# Patient Record
Sex: MALE | Race: WHITE | Employment: OTHER | URBAN - METROPOLITAN AREA
[De-identification: names, ages, dates, MRNs, and addresses within clinical notes are randomized per-mention and may not be internally consistent; named-entity substitution may affect disease eponyms.]

---

## 2017-01-27 ENCOUNTER — PRE VISIT (OUTPATIENT)
Dept: ANESTHESIOLOGY | Facility: CLINIC | Age: 57
End: 2017-01-27

## 2017-01-27 NOTE — TELEPHONE ENCOUNTER
1.  Date/reason for appt: 2/15/17 - MS   2.  Referring provider: [facility] Neuro Dr. Rodríguez  3.  Call to patient (Yes / No - short description): no, recs in epic  4.  Previous care at:   - [facility]   - [facility]

## 2017-02-15 ENCOUNTER — OFFICE VISIT (OUTPATIENT)
Dept: ANESTHESIOLOGY | Facility: CLINIC | Age: 57
End: 2017-02-15

## 2017-02-15 VITALS
WEIGHT: 269.6 LBS | BODY MASS INDEX: 35.73 KG/M2 | DIASTOLIC BLOOD PRESSURE: 79 MMHG | OXYGEN SATURATION: 98 % | HEIGHT: 73 IN | HEART RATE: 70 BPM | SYSTOLIC BLOOD PRESSURE: 131 MMHG

## 2017-02-15 DIAGNOSIS — M79.2 NEUROPATHIC PAIN: ICD-10-CM

## 2017-02-15 DIAGNOSIS — G37.9 CNS DEMYELINATING DISEASE (H): ICD-10-CM

## 2017-02-15 DIAGNOSIS — M54.2 NECK PAIN OF OVER 3 MONTHS DURATION: ICD-10-CM

## 2017-02-15 DIAGNOSIS — R20.8 ALLODYNIA: Primary | ICD-10-CM

## 2017-02-15 DIAGNOSIS — Z97.8 PRESENCE OF INTRATHECAL PUMP: ICD-10-CM

## 2017-02-15 DIAGNOSIS — F11.90 CHRONIC, CONTINUOUS USE OF OPIOIDS: ICD-10-CM

## 2017-02-15 ASSESSMENT — PAIN SCALES - GENERAL: PAINLEVEL: MODERATE PAIN (5)

## 2017-02-15 ASSESSMENT — ANXIETY QUESTIONNAIRES
7. FEELING AFRAID AS IF SOMETHING AWFUL MIGHT HAPPEN: 0 = NOT AT ALL
GAD7 TOTAL SCORE: 0
GAD7 TOTAL SCORE: 0

## 2017-02-15 NOTE — LETTER
2/15/2017       RE: Jeff Lewis  [address] [place] [address]     Dear Colleague,    Thank you for referring your patient, Jeff Lewis, to the [facility] FOR COMPREHENSIVE PAIN MANAGEMENT at [facility]. Please see a copy of my visit note below.    I had the pleasure of meeting Mr. Jeff Lewis on 2/15/2017 in the outpatient pain clinic in consult for Dr. Rodríguez with regards to his chronic neck pain.  HPI:  57yo male with past medical history of Multiple Sclerosis, bicuspid aortic valve, chronic pain syndrome and obestiy presents for evaluation of his chronic neck pain.   The pain began over 10 years ago when he was diagnosed with MS.  His symptoms started with right cervical paraspinal neuropathic pain that radiates to the anterior neck and jaw.  He stated that at first he was diagnosed with fibromyalgia, however MRI imaging noted a hyperintense lesion on the right side of his upper cervical spinal cord and an LP showed oligoclonal bands and he was diagnosed with MS.   He states his uncle has MS as well.  He has been following Dr. Rodríguez here at the MS clinic.   According to his notes Jeff does not have symptoms of relapsing/remitting type of MS and his course has been relatively stable and he is not on any disease modifying therapies currently.   His pain has been intractable however.    He is currently under the care of [facility] and has an opioid contract with them.   He was on chronic oral opioids and was implanted with a ITP about 5 years ago.   He had it replaced in 2015 but had complications in the hospital including severe nausea and sedation.  His current pump has prialt and hydromorphone (he reports he is on his lowest dose in years).   With regards to efficacy, he said he had some partial benefit years ago but currently cannot tell if it is working or not.   According to his wife, prior to October of last  "year he was very sedated, unable to get out of bed and seemed \"out of it\".  His dose was decreased a few months ago and his wife noted that he has much more energy and motivation and Jeff agrees.     From a functional standpoint, Jeff agrees he can handle all his activities of daily living, he drives himself, can lift his grandkids and does chores around the house.   The pain is always present but currently is not limiting his function.   He worked as a  for many years and his biggest complaint is that he cannot return to work, which he states is because of his ITP.  He states he feels his relationship with [employer] is deteriorating.  He has to drive over an hour to meet with them.   He feels they are pressuring him to increase his pain pump dose again which he is reluctant to do.  He is also reluctant to try to shut it off and explant as well, but he cannot definitively state that it is working for him.   He is upset that he does not see a physician routinely there either.   He would like to transfer his care to our clinic for these reasons and Dr. Rodríguez referred him to us for evaluation..    Location: cervcial spine midline with radiation to anterior jaw line and down to clavical  Quality: sharp stabbing.  Severity: 6/10  Timing: constant  Duration: years (10 years)  Context: MS - demyelenating lesion  Aggravating Factors:light touch, heat, stress  Relieving Factors:cold  Associated Signs/Symptoms: fatigue    he has tried the following therapies for his pain:  Medications:   Current:  -ITP - prialt and hydromorphone  -Hydromorphone PO - prescribed by [facility]  In 4/2016 -not taking currently - does not like them -cause nausea  -Ibuprofen 800mg - 1-2 a month - help with back pain, not neck  -Lidocaine cream - helps.  Past:  -Baclofen - weight gain  -Tizanidine - not effective  -Steroids, IV/Patch/PO - no relief, mood changes  -Gabapentin- undesirable weight gain  -Topamax - no help  -Cymbalta - was " "stopped after his pump implant - didn't restart - had sexual side effects, also felt it wasn't effective.    Physical Therapy:  - currently walking and riding a stationary bike (quit for last month) - denies any formal PT therapy for his neck.  Injections/Interventions:  - Has had MBNB at [facility] with no effect  Pain Psychology:  - never seen  Other Modalities:  Chiropractic care:yes  Acupuncture:have not done  TENS:can't tolerate  Water Based Therapy:no  Meditation:no  Yoga:no    Past Medical History   Diagnosis Date     Back pain      Coarctation of aorta      CTS (carpal tunnel syndrome)      Dyslipidemia      Falls      past hx of falls     MS (multiple sclerosis) ([facility])      Other chronic pain      Pain 8/15/2015     Pneumonia      \"yearly\" per pt\"     Polyuria      Preiser disease      Right bundle branch block      Vitamin D deficiency     past medical history reviewed with patient.   Past Surgical History   Procedure Laterality Date     Gallbladder surgery       Wrist surgery       Cholecystectomy  2010     Ent surgery  1963     Insert intrathecal pain pump       Bilateral carapl tunnel       Replace intrathecal pain pump N/A 8/14/2015     Procedure: REPLACE INTRATHECAL PAIN PUMP;  Surgeon: Sukumar Salinas MD;  Location: [facility] OR    past surgical history reviewed with patient.   Medications:  Current Outpatient Prescriptions   Medication     IBUPROFEN PO     Medication given by intrathecal pump     No current facility-administered medications for this visit.      [place] and [place] Prescription Monitoring Program reviewed and no aberrancies noted.     Allergies:     Allergies   Allergen Reactions     Naprosyn [Naproxen] Swelling     Nuts Swelling     Tree nuts     Family History:  family history includes CANCER in his father and mother; Neurologic Disorder in an other family member.  Social history: he lives in [place] with wife. he is not currently working and is on disability.  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   Smoking: " "3-4 cigarettes a day. Alcohol: no. Street drugs: no.     ROS:  Skin: negative for rash or swelling  Eyes: + for double vision - for blurry vision  Ears/Nose/Throat: negative for dysphagia or sore throat  Respiratory: negative for shortness of breath or cough  Cardiovascular: negative for chest pain or dyspnea on exertion, +for bicuspid aortic valve  Gastrointestinal: negative for abdominal pain, nausea, decreased appetite or bowel incontinence  Genitourinary: negative for bladder incontinence, hematuria and frequent urination  Musculoskeletal:negative for joint pain, myalgias, low back pain and cervical spine pain.  Neurologic: + for weakness in legs, negative numbness or paresthesias  Psychiatric: negative for depression, anxiety and SI/HI  Hematologic/Lymphatic/Immunologic: Negative for anemia, easy bruising bleeding, frequent infections.  Endocrine: negative for thyroid abnormalities and diabetes    Objective:   /79  Pulse 70  Ht 1.854 m (6' 1\")  Wt 122.3 kg (269 lb 9.6 oz)  SpO2 98%  BMI 35.57 kg/m2  Body mass index is 35.57 kg/(m^2).  General: In no apparent distress, obese  Mental status: Normal affect, pleasant  Head: Atraumatic, normocephalic  Eyes: Extra-ocular movements grossly intact, no scleral icterus  Cardiovascular: Regular rate  Respiratory:No respiratory distress  Abdomen:soft, non-distended  Msk:   Cervical Spine Exam: No lesions noted, normal ROM, +TTP of right paraspinal muscles, neck and jaw with significant allodynia.  -Spurling's Bilaterally, -Facet loading bilaterally, CN2-12 grossly intact.      Strength 5/5 for bilateral shoulder abduction, elbow flexion, wrist extension, elbow extension, finger abduction, and grasp. Sensation intact to light touch throughout the C5-T1 dermatomes of the bilateral upper extremities.  Reflexes 2+/4 and symmetric for biceps, triceps, brachioradialis. Negative becker's bilaterally.   Skin: No rashes or lesions noted on exposed areas of skin.  Old " surgical scar on right hand and wrist from injury.  Lymph: no supraclavicular lymphadenopathy    Imaging:   MR CERVICAL SPINE W/O & W CONTRAST 10/20/2016 11:54 AM     History: Multiple sclerosis. Gait disturbance     Comparison: MRI cervical spine 6/13/2014, 2/10/2011     Technique: Sagittal T1-weighted, sagittal T2-weighted, sagittal  diffusion weighted, axial T2-weighted, and axial T2* gradient echo  images of the cervical spine were obtained without intravenous  contrast. Following intravenous administration of gadolinium, axial  and sagittal T1-weighted images with fat saturation were also  obtained.     Contrast: 10 ml Gadavist injected     Findings:     Stable patchy T2 hyperintense signal within the right dorsal lateral  aspect of the cord C2-C3 (se6/im6-8). No new cord T2 signal  abnormality. No abnormal intrathecal enhancement.     Normal alignment of the cervical vertebrae. Straightening of the  normal cervical lordosis. Congenital spinal canal narrowing with  superimposed spondylosis C3-C7. Multilevel disc degeneration, uncinate  spurring and facet hypertrophy. Normal vertebral marrow signal.  Unchanged loss of the normal left vertebral artery V2 segment  flow-void.      The findings on a level by level basis are as follows:     C2-3: No spinal canal or neural foraminal narrowing.     C3-4: Mild spinal canal stenosis. Moderate right and mild left neural  foraminal stenosis.      C4-5: Mild to moderate central canal stenosis. Moderate to advanced  right and moderate left neural foraminal stenosis.     C5-6: Mild spinal canal stenosis. Moderate to advanced left and  moderate right neural foraminal stenosis.     C6-7: Mild spinal canal stenosis. Mild bilateral neural foraminal  stenosis.     C7-T1: No spinal canal or neural foraminal narrowing.      No abnormality of the paraspinous soft tissues. New 2.0 cm left level  2 cervical lymph node. (Series 7, image 7)         Impression:   1. Stable right  dorsolateral cord T2 signal abnormality at C2-C3,  compatible with history of demyelinating disease. No evidence of  interval or active demyelination.  2. Stable multilevel cervical spondylosis superimposed on congenital  spinal canal narrowing. Mild/moderate central canal stenosis at L4-L5.  Moderate to advanced right neural foraminal stenosis at C4-C5 and on  the left at C5-C6.  3. Stable left vertebral artery V2 segment occlusion.   4. New enlarged left level 2 cervical lymph node. While this may be  reactive, recommend short-term clinical follow-up.    Assessment:  1. Chronic Neuropathic Pain  2. Allodynia - cervical and anterior neck  3. Multiple Sclerosis - CNS demyelinating disease  4. Presence of ITP  5. Chronic, continuous use of opioids.    Mr. Lewis is an unfortunate man with severe neuropathic pain and nondermatomal allodynia presumable secondary to a spinal cord lesion related to MS.  The pain has been persistent for years.   He has a ITP in place that is currently managed by [employer].  I explained to him our clinic policy of not assuming management of ITP that have been [place] by [place] groups [place] as it [place] their responsibility to manage their surgical implants.   However, he has been seemingly very sedated on higher doses as evidenced by his increase in alertness with decreasing the dose.  He is currently unsure of how much analgesic effect he is getting from this pump.    His functional status is excellent right now, which I explained is the primary outcome that I measure at our pain clinic.   I did discuss with him the option of turning his pump off for a 3-4 week period and if his function remains the same and his pain isn't much worse I could accept him as a patient with the goal of explanting the pump through NSGY, but the dose decrease again would have to be managed by [employer] as his opioid contract is through them.   This would allow him to theoretically attempt a return to work,  which seems to be his main limiting factor in quality of life.    He agreed to consider it.  If he decides to explant his pump I would be happy to assume continued pain management for him.    Plan:   1. Patient education: I went over the above diagnoses and treatment plan with him and answered all of his questions.  2. Imaging review: I reviewed the above imaging with him   3. Interventions: none recommended at this time.  4. Medications: I discussed the merits of returning to normal neuropathic pain medications, topical creams (gabapentin/ketamine PLO) and lidocaine cream which he does notes help.   The goal would be to maintain function and allow him to return to work as that seems to be his primary goal.  5. Exercise program:I emphasized the benefits of daily exercise and smoking cessation in managing chronic pain.  6. Behavioral Psychology: I think he would benefit from mindfulness training and pain psychology counseling as well as possibly integrative medicine to help him manage pain flares.  7. Further Diagnostic Testing/Imaging:None at this time  8. Referrals: He is interested in acupuncture as well, I will provide a referral.  9. Follow up: He can call to schedule an appointment with me if he decides to wean off his pain pump as I cannot take over management of this device at this time.    Total time spent was 60 minutes, and more than 50% of face to face time was spent in counseling and/or coordination of care regarding the above plan.    Thank you for the consult.   Eligio Michelle DO    [facility]  Pain Management  Department of Anesthesiology    Again, thank you for allowing me to participate in the care of your patient.      Sincerely,    Eligio Michelle DO

## 2017-02-15 NOTE — NURSING NOTE
Reviewed AVS with patient includin. [facility] [phone]  Karthik Smith   [address], [place]      2. Follow up with [facility] for continued management of your medications and intrathecal pump.    3. If you chose to wean/stop your intrathecal pump and want to follow up with us for further care, please call our clinic to set up an appointment.     Pt verbalized understanding of instructions.

## 2017-02-15 NOTE — PROGRESS NOTES
"I had the pleasure of meeting Mr. Jeff Lewis on 2/15/2017 in the outpatient pain clinic in consult for Dr. Rodríguez with regards to his chronic neck pain.  HPI:  55yo male with past medical history of Multiple Sclerosis, bicuspid aortic valve, chronic pain syndrome and obestiy presents for evaluation of his chronic neck pain.   The pain began over 10 years ago when he was diagnosed with MS.  His symptoms started with right cervical paraspinal neuropathic pain that radiates to the anterior neck and jaw.  He stated that at first he was diagnosed with fibromyalgia, however MRI imaging noted a hyperintense lesion on the right side of his upper cervical spinal cord and an LP showed oligoclonal bands and he was diagnosed with MS.   He states his uncle has MS as well.  He has been following Dr. Rodríguez here at the MS clinic.   According to his notes Jeff does not have symptoms of relapsing/remitting type of MS and his course has been relatively stable and he is not on any disease modifying therapies currently.   His pain has been intractable however.    He is currently under the care of [facility] and has an opioid contract with them.   He was on chronic oral opioids and was implanted with a ITP about 5 years ago.   He had it replaced in 2015 but had complications in the hospital including severe nausea and sedation.  His current pump has prialt and hydromorphone (he reports he is on his lowest dose in years).   With regards to efficacy, he said he had some partial benefit years ago but currently cannot tell if it is working or not.   According to his wife, prior to October of last year he was very sedated, unable to get out of bed and seemed \"out of it\".  His dose was decreased a few months ago and his wife noted that he has much more energy and motivation and Jeff agrees.     From a functional standpoint, Jeff agrees he can handle all his activities of daily living, he drives himself, " can lift his grandkids and does chores around the house.   The pain is always present but currently is not limiting his function.   He worked as a  for many years and his biggest complaint is that he cannot return to work, which he states is because of his ITP.  He states he feels his relationship with [employer] is deteriorating.  He has to drive over an hour to meet with them.   He feels they are pressuring him to increase his pain pump dose again which he is reluctant to do.  He is also reluctant to try to shut it off and explant as well, but he cannot definitively state that it is working for him.   He is upset that he does not see a physician routinely there either.   He would like to transfer his care to our clinic for these reasons and Dr. Rodríguez referred him to us for evaluation..    Location: cervcial spine midline with radiation to anterior jaw line and down to clavical  Quality: sharp stabbing.  Severity: 6/10  Timing: constant  Duration: years (10 years)  Context: MS - demyelenating lesion  Aggravating Factors:light touch, heat, stress  Relieving Factors:cold  Associated Signs/Symptoms: fatigue    he has tried the following therapies for his pain:  Medications:   Current:  -ITP - prialt and hydromorphone  -Hydromorphone PO - prescribed by [facility]  In 4/2016 -not taking currently - does not like them -cause nausea  -Ibuprofen 800mg - 1-2 a month - help with back pain, not neck  -Lidocaine cream - helps.  Past:  -Baclofen - weight gain  -Tizanidine - not effective  -Steroids, IV/Patch/PO - no relief, mood changes  -Gabapentin- undesirable weight gain  -Topamax - no help  -Cymbalta - was stopped after his pump implant - didn't restart - had sexual side effects, also felt it wasn't effective.    Physical Therapy:  - currently walking and riding a stationary bike (quit for last month) - denies any formal PT therapy for his neck.  Injections/Interventions:  - Has had MBNB at [facility] with no effect  Pain  "Psychology:  - never seen  Other Modalities:  Chiropractic care:yes  Acupuncture:have not done  TENS:can't tolerate  Water Based Therapy:no  Meditation:no  Yoga:no    Past Medical History   Diagnosis Date     Back pain      Coarctation of aorta      CTS (carpal tunnel syndrome)      Dyslipidemia      Falls      past hx of falls     MS (multiple sclerosis) (H)      Other chronic pain      Pain 8/15/2015     Pneumonia      \"yearly\" per pt\"     Polyuria      Preiser disease      Right bundle branch block      Vitamin D deficiency     past medical history reviewed with patient.   Past Surgical History   Procedure Laterality Date     Gallbladder surgery       Wrist surgery       Cholecystectomy  2010     Ent surgery  1963     Insert intrathecal pain pump       Bilateral carapl tunnel       Replace intrathecal pain pump N/A 8/14/2015     Procedure: REPLACE INTRATHECAL PAIN PUMP;  Surgeon: Sukumar Salinas MD;  Location: [facility] OR    past surgical history reviewed with patient.   Medications:  Current Outpatient Prescriptions   Medication     IBUPROFEN PO     Medication given by intrathecal pump     No current facility-administered medications for this visit.      [place] and [place] Prescription Monitoring Program reviewed and no aberrancies noted.     Allergies:     Allergies   Allergen Reactions     Naprosyn [Naproxen] Swelling     Nuts Swelling     Tree nuts     Family History:  family history includes CANCER in his father and mother; Neurologic Disorder in an other family member.  Social history: he lives in [place] with wife. he is not currently working and is on disability.  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   Smoking: 3-4 cigarettes a day. Alcohol: no. Street drugs: no.     ROS:  Skin: negative for rash or swelling  Eyes: + for double vision - for blurry vision  Ears/Nose/Throat: negative for dysphagia or sore throat  Respiratory: negative for shortness of breath or cough  Cardiovascular: negative for chest pain or dyspnea on " "exertion, +for bicuspid aortic valve  Gastrointestinal: negative for abdominal pain, nausea, decreased appetite or bowel incontinence  Genitourinary: negative for bladder incontinence, hematuria and frequent urination  Musculoskeletal:negative for joint pain, myalgias, low back pain and cervical spine pain.  Neurologic: + for weakness in legs, negative numbness or paresthesias  Psychiatric: negative for depression, anxiety and SI/HI  Hematologic/Lymphatic/Immunologic: Negative for anemia, easy bruising bleeding, frequent infections.  Endocrine: negative for thyroid abnormalities and diabetes    Objective:   /79  Pulse 70  Ht 1.854 m (6' 1\")  Wt 122.3 kg (269 lb 9.6 oz)  SpO2 98%  BMI 35.57 kg/m2  Body mass index is 35.57 kg/(m^2).  General: In no apparent distress, obese  Mental status: Normal affect, pleasant  Head: Atraumatic, normocephalic  Eyes: Extra-ocular movements grossly intact, no scleral icterus  Cardiovascular: Regular rate  Respiratory:No respiratory distress  Abdomen:soft, non-distended  Msk:   Cervical Spine Exam: No lesions noted, normal ROM, +TTP of right paraspinal muscles, neck and jaw with significant allodynia.  -Spurling's Bilaterally, -Facet loading bilaterally, CN2-12 grossly intact.      Strength 5/5 for bilateral shoulder abduction, elbow flexion, wrist extension, elbow extension, finger abduction, and grasp. Sensation intact to light touch throughout the C5-T1 dermatomes of the bilateral upper extremities.  Reflexes 2+/4 and symmetric for biceps, triceps, brachioradialis. Negative becker's bilaterally.   Skin: No rashes or lesions noted on exposed areas of skin.  Old surgical scar on right hand and wrist from injury.  Lymph: no supraclavicular lymphadenopathy    Imaging:   MR CERVICAL SPINE W/O & W CONTRAST 10/20/2016 11:54 AM     History: Multiple sclerosis. Gait disturbance     Comparison: MRI cervical spine 6/13/2014, 2/10/2011     Technique: Sagittal T1-weighted, sagittal " T2-weighted, sagittal  diffusion weighted, axial T2-weighted, and axial T2* gradient echo  images of the cervical spine were obtained without intravenous  contrast. Following intravenous administration of gadolinium, axial  and sagittal T1-weighted images with fat saturation were also  obtained.     Contrast: 10 ml Gadavist injected     Findings:     Stable patchy T2 hyperintense signal within the right dorsal lateral  aspect of the cord C2-C3 (se6/im6-8). No new cord T2 signal  abnormality. No abnormal intrathecal enhancement.     Normal alignment of the cervical vertebrae. Straightening of the  normal cervical lordosis. Congenital spinal canal narrowing with  superimposed spondylosis C3-C7. Multilevel disc degeneration, uncinate  spurring and facet hypertrophy. Normal vertebral marrow signal.  Unchanged loss of the normal left vertebral artery V2 segment  flow-void.      The findings on a level by level basis are as follows:     C2-3: No spinal canal or neural foraminal narrowing.     C3-4: Mild spinal canal stenosis. Moderate right and mild left neural  foraminal stenosis.      C4-5: Mild to moderate central canal stenosis. Moderate to advanced  right and moderate left neural foraminal stenosis.     C5-6: Mild spinal canal stenosis. Moderate to advanced left and  moderate right neural foraminal stenosis.     C6-7: Mild spinal canal stenosis. Mild bilateral neural foraminal  stenosis.     C7-T1: No spinal canal or neural foraminal narrowing.      No abnormality of the paraspinous soft tissues. New 2.0 cm left level  2 cervical lymph node. (Series 7, image 7)         Impression:   1. Stable right dorsolateral cord T2 signal abnormality at C2-C3,  compatible with history of demyelinating disease. No evidence of  interval or active demyelination.  2. Stable multilevel cervical spondylosis superimposed on congenital  spinal canal narrowing. Mild/moderate central canal stenosis at L4-L5.  Moderate to advanced right  neural foraminal stenosis at C4-C5 and on  the left at C5-C6.  3. Stable left vertebral artery V2 segment occlusion.   4. New enlarged left level 2 cervical lymph node. While this may be  reactive, recommend short-term clinical follow-up.    Assessment:  1. Chronic Neuropathic Pain  2. Allodynia - cervical and anterior neck  3. Multiple Sclerosis - CNS demyelinating disease  4. Presence of ITP  5. Chronic, continuous use of opioids.    Mr. Lewis is an unfortunate man with severe neuropathic pain and nondermatomal allodynia presumable secondary to a spinal cord lesion related to MS.  The pain has been persistent for years.   He has a ITP in place that is currently managed by [employer].  I explained to him our clinic policy of not assuming management of ITP that have been [place] by [place] groups [place] as it [place] their responsibility to manage their surgical implants.   However, he has been seemingly very sedated on higher doses as evidenced by his increase in alertness with decreasing the dose.  He is currently unsure of how much analgesic effect he is getting from this pump.    His functional status is excellent right now, which I explained is the primary outcome that I measure at our pain clinic.   I did discuss with him the option of turning his pump off for a 3-4 week period and if his function remains the same and his pain isn't much worse I could accept him as a patient with the goal of explanting the pump through [facility], but the dose decrease again would have to be managed by [employer] as his opioid contract is through them.   This would allow him to theoretically attempt a return to work, which seems to be his main limiting factor in quality of life.    He agreed to consider it.  If he decides to explant his pump I would be happy to assume continued pain management for him.    Plan:   1. Patient education: I went over the above diagnoses and treatment plan with him and answered all of his  questions.  2. Imaging review: I reviewed the above imaging with him   3. Interventions: none recommended at this time.  4. Medications: I discussed the merits of returning to normal neuropathic pain medications, topical creams (gabapentin/ketamine PLO) and lidocaine cream which he does notes help.   The goal would be to maintain function and allow him to return to work as that seems to be his primary goal.  5. Exercise program:I emphasized the benefits of daily exercise and smoking cessation in managing chronic pain.  6. Behavioral Psychology: I think he would benefit from mindfulness training and pain psychology counseling as well as possibly integrative medicine to help him manage pain flares.  7. Further Diagnostic Testing/Imaging:None at this time  8. Referrals: He is interested in acupuncture as well, I will provide a referral.  9. Follow up: He can call to schedule an appointment with me if he decides to wean off his pain pump as I cannot take over management of this device at this time.    Total time spent was 60 minutes, and more than 50% of face to face time was spent in counseling and/or coordination of care regarding the above plan.    Thank you for the consult.   Eligio Michelle DO    [facility]  Pain Management  Department of Anesthesiology        Answers for HPI/ROS submitted by the patient on 2/15/2017   General Symptoms: No  Skin Symptoms: No  HENT Symptoms: No  EYE SYMPTOMS: No  HEART SYMPTOMS: No  LUNG SYMPTOMS: No  INTESTINAL SYMPTOMS: No  URINARY SYMPTOMS: No  REPRODUCTIVE SYMPTOMS: No  SKELETAL SYMPTOMS: No  BLOOD SYMPTOMS: No  NERVOUS SYSTEM SYMPTOMS: No  MENTAL HEALTH SYMPTOMS: No  PHQ-2 Depressed: Not at all, Not at all  PHQ-2 Score: 0  LEANNA 7 TOTAL SCORE: 0

## 2017-02-15 NOTE — MR AVS SNAPSHOT
After Visit Summary   2/15/2017    Jeff Lewis    MRN: [id number]           Patient Information     Date Of Birth          1960        Visit Information        Provider Department      2/15/2017 7:00 AM Eligio Michelle DO [facility]        Today's Diagnoses     Allodynia    -  1    Neck pain of over 3 months duration        Neuropathic pain        CNS demyelinating disease (H)        Presence of intrathecal pump        Chronic, continuous use of opioids          Care Instructions    1. [facility] [phone]  Karthik Smith   [address], [place]      2. Follow up with [facility] for continued management of your medications and intrathecal pump.    3. If you chose to wean/stop your intrathecal pump and want to follow up with us for further care, please call our clinic to set up an appointment.     To speak with a nurse, schedule/reschedule/cancel a clinic appointment, or request a medication refill call: [phone]     *You can also reach us by Stylefinch: [email or web address]                    Follow-ups after your visit        Additional Services     ACUPUNCTURE REFERRAL       Your provider has referred you to: Karthik Smith    Please be aware that coverage of these services is subject to the terms and limitations of your health insurance plan.  Call member services at your health plan with any benefit or coverage questions.      Please bring the following with you to your appointment:    (1) Any X-Rays, CTs or MRIs which have been performed.  Contact the facility where they were done to arrange for  prior to your scheduled appointment.  (2) List of current medications   (3) This referral request   (4) Any documents/labs given to you for this referral  Services Requested: MEDICAL: Consultation for Medical Management/Behavioral/Reconditioning Therapy    Please answer the following  questions:    1. How long have you been treating this patient for this pain issue?      10 year(s)    2. Have there been any of the following problematic behaviors?      Medication Management Issues: No      Chemical Dependency: NO      Psychiatric History or Current Psych/Social Issues: NO    3. Has the patient been to any other pain clinics and/or programs?      YES (provide service and date): currently with [facility] who manages IT Pump.                  Your next 10 appointments already scheduled     Mar 30, 2017  8:00 AM CDT   (Arrive by 7:45 AM)   Return Multiple Sclerosis with Federico Rodríguez MD   [facility] ([facility])    [address]  3rd Floor  [facility] [address] [phone]              Who to contact     Please call your clinic at [phone] to:    Ask questions about your health    Make or cancel appointments    Discuss your medicines    Learn about your test results    Speak to your doctor   If you have compliments or concerns about an experience at your clinic, or if you wish to file a complaint, please contact [facility] Patient Relations at [phone] or email us at [email or web address]         Additional Information About Your Visit        [employer] Information     [employer] is an electronic gateway that provides easy, online access to your medical records. With TagMan, you can request a clinic appointment, read your test results, renew a prescription or communicate with your care team.     To sign up for [employer] visit the website at [email or web address]   You will be asked to enter the access code listed below, as well as some personal information. Please follow the directions to create your username and password.     Your access code is: [email or web address]  Expires: 3/26/2017  6:30 AM     Your access code will  in 90 days. If you need help or a new code, please contact your [facility]  "[facility] or call [phone] for assistance.        Care EveryWhere ID     This is your Care EveryWhere ID. This could be used by other organizations to access your [place] medical records  [id number]        Your Vitals Were     Pulse Height Pulse Oximetry BMI (Body Mass Index)          70 1.854 m (6' 1\") 98% 35.57 kg/m2         Blood Pressure from Last 3 Encounters:   02/15/17 131/79   10/20/16 149/86   09/29/16 (!) 145/93    Weight from Last 3 Encounters:   02/15/17 122.3 kg (269 lb 9.6 oz)   09/29/16 122.8 kg (270 lb 12.8 oz)   02/24/16 119.7 kg (264 lb)              We Performed the Following     [facility]CTURE REFERRAL     [facility]          Today's Medication Changes          These changes are accurate as of: 2/15/17  8:07 AM.  If you have any questions, ask your nurse or doctor.               These medicines have changed or have updated prescriptions.        Dose/Directions    Medication given by intrathecal pump   This may have changed:  additional instructions   Used for:  Cervicalgia        NO DRUG NEEDED.   Quantity:  1 each   Refills:  0                Primary Care Provider Office Phone # Fax #    Sukumar España [phone]       [facility] [address]  [facility] [address]        Thank you!     Thank you for choosing [facility]  for your care. Our goal is always to provide you with excellent care. Hearing back from our patients is one way we can continue to improve our services. Please take a few minutes to complete the written survey that you may receive in the mail after your visit with us. Thank you!             Your Updated Medication List - Protect others around you: Learn how to safely use, store and throw away your medicines at www.disposemymeds.org.          This list is accurate as of: 2/15/17  8:07 AM.  Always use your most recent med list.                "    Brand Name Dispense Instructions for use    IBUPROFEN PO      Take 600 mg by mouth every 6 hours as needed for moderate pain       Medication given by intrathecal pump     1 each    NO DRUG NEEDED.

## 2017-02-15 NOTE — PATIENT INSTRUCTIONS
1. [facility] [phone]  Karthik Smith   [address], [place]      2. Follow up with [facility] for continued management of your medications and intrathecal pump.    3. If you chose to wean/stop your intrathecal pump and want to follow up with us for further care, please call our clinic to set up an appointment.     To speak with a nurse, schedule/reschedule/cancel a clinic appointment, or request a medication refill call: [phone]     *You can also reach us by Brainly: [email or web address]

## 2017-02-16 ASSESSMENT — ANXIETY QUESTIONNAIRES: GAD7 TOTAL SCORE: 0

## 2017-03-14 ENCOUNTER — TRANSFERRED RECORDS (OUTPATIENT)
Dept: HEALTH INFORMATION MANAGEMENT | Facility: CLINIC | Age: 57
End: 2017-03-14

## 2017-03-15 ENCOUNTER — TRANSFERRED RECORDS (OUTPATIENT)
Dept: HEALTH INFORMATION MANAGEMENT | Facility: CLINIC | Age: 57
End: 2017-03-15

## 2017-03-20 ENCOUNTER — TELEPHONE (OUTPATIENT)
Dept: NEUROLOGY | Facility: CLINIC | Age: 57
End: 2017-03-20

## 2017-03-20 DIAGNOSIS — G37.9 CNS DEMYELINATING DISEASE (H): Primary | ICD-10-CM

## 2017-03-20 NOTE — TELEPHONE ENCOUNTER
I called David, but I was unable to reach him; I left [facility] for him asking him to call me back to discuss his MRI results.    Talya Jaramillo, MS RN Care Coordinator

## 2017-03-20 NOTE — TELEPHONE ENCOUNTER
He had a pontine lesion on the recent MRI that is (probably) new, but not clear if it is acute or not. It would have been helpful if they had discussed this with us prior to the MRI being done--lack of contrast makes the interpretation difficult.     This raises a couple of questions, including whether we should treat with steroids (I would probably not favor that unless he finds that the facial numbness is interfering with function) and whether he should be on disease modifying therapy with MS.    Ideally he should have a repeat MRI with contrast; unfortunately his appointment next week is at 8 so that will not be possible on the day of the visit unless he does it after.

## 2017-03-20 NOTE — TELEPHONE ENCOUNTER
Patient saw his PCP last week for left facial numbness, and subsequently, had an MRI of the brain done; Patient had these images pushed to [facility], which are now available; Scanned in the media tab is the office visit note with his PCP and the MRI brain report; Dr Rodríguez, he is scheduled to see you next Thursday 3/30/17; Is there anything you would like done prior? I have not personally spoken with the patient; Thank you.    Talya Jaramillo, MS RN Care Coordinator

## 2017-03-21 ENCOUNTER — TELEPHONE (OUTPATIENT)
Dept: INTERVENTIONAL RADIOLOGY/VASCULAR | Facility: CLINIC | Age: 57
End: 2017-03-21

## 2017-03-21 NOTE — TELEPHONE ENCOUNTER
MRI order placed per Dr Rodríguez; When I was on the phone with David kilgore, he said that any day/time would be okay for his MRI; I called MRI scheduling and was able to get scheduled for this Thursday, 3/23/17 at 10am at [facility], with a check in time of 9am; He will check in to the [facility] waiting room at 2A; No solid foods prior to 10am (2am), and no clear liquids at 4 hours prior to 10am (6am); He must have a ; I called Jeff and went through all of this with him, and he is able to make this work; He has no further questions at this time; He understands that we will be in touch with him after his MRIs have been reviewed by the radiologist and Dr Rodríguez.    Talya Jaramillo, MS RN Care Coordinator

## 2017-03-21 NOTE — TELEPHONE ENCOUNTER
"I called David to go over with him the below information from Dr Rodríguez; When David answered, he said \"it's really getting worse\"; I decided to start from the beginning with him to be sure we don't miss anything; He said that two weeks ago, he started with a change in sensation of the left side of his face, which he described as a little tingly; Now, he is completely numb on the left face from his scalp, down to his chin/jawline, including his cheek, ear, eye and left side of his mouth; This numbness is constant; He best describes this as \"being shot with Novocain\"; He did check with his dentist to see if he possibly had a tooth infection, but his dentist said no; He also said that he has been getting really bad headaches, which he hasn't had bad headaches like this before; He said that the numbness has some discomfort to it; No speech changes; He does have to be careful when he eats since his face/mouth is so numb; He has not been coughing or choking on foods; He doesn't feel that his swallowing is impaired; He said that his balance has been off more as well, such as he has to hold on to things more when he walks, and he feels more clumsy; He said that he can't just stand without leaning against the wall, for example; No dizziness; No other numbness/tingling/weakness; He has been getting more exhausted with activities, such as going up a flight of stairs; No bowel or bladder changes from baseline; I talked with him about Dr Rodríguez's input; He finds the symptoms bothersome enough that he would like steroids, even though he doesn't like them; He would like to have the MRI done recommended by Dr Rodríguez, and he would like it done at [facility] with conscious sedation; I told him that I would send all of this to Dr Rodríguez, then call him back later today; Dr Rodríguez, would you like me to order the MRI with IV conscious sedation and steroids? Or do you want to wait on the steroids based on his MRI result? " Thank you.    Talya Jaramillo, MS RN Care Coordinator

## 2017-03-23 ENCOUNTER — HOSPITAL ENCOUNTER (OUTPATIENT)
Dept: MRI IMAGING | Facility: CLINIC | Age: 57
End: 2017-03-23
Attending: PSYCHIATRY & NEUROLOGY | Admitting: PSYCHIATRY & NEUROLOGY
Payer: COMMERCIAL

## 2017-03-23 ENCOUNTER — APPOINTMENT (OUTPATIENT)
Dept: MEDSURG UNIT | Facility: CLINIC | Age: 57
End: 2017-03-23
Attending: PSYCHIATRY & NEUROLOGY
Payer: COMMERCIAL

## 2017-03-23 ENCOUNTER — HOSPITAL ENCOUNTER (OUTPATIENT)
Facility: CLINIC | Age: 57
Discharge: HOME | End: 2017-03-23
Attending: PSYCHIATRY & NEUROLOGY | Admitting: PSYCHIATRY & NEUROLOGY
Payer: COMMERCIAL

## 2017-03-23 VITALS
DIASTOLIC BLOOD PRESSURE: 68 MMHG | WEIGHT: 260 LBS | HEIGHT: 73 IN | BODY MASS INDEX: 34.46 KG/M2 | RESPIRATION RATE: 16 BRPM | SYSTOLIC BLOOD PRESSURE: 121 MMHG | OXYGEN SATURATION: 95 % | HEART RATE: 70 BPM | TEMPERATURE: 97.9 F

## 2017-03-23 VITALS
OXYGEN SATURATION: 99 % | DIASTOLIC BLOOD PRESSURE: 82 MMHG | HEART RATE: 66 BPM | RESPIRATION RATE: 16 BRPM | SYSTOLIC BLOOD PRESSURE: 143 MMHG

## 2017-03-23 DIAGNOSIS — G37.9 CNS DEMYELINATING DISEASE (H): ICD-10-CM

## 2017-03-23 PROCEDURE — 40000166 ZZH STATISTIC PP CARE STAGE 1

## 2017-03-23 PROCEDURE — A9585 GADOBUTROL INJECTION: HCPCS | Performed by: PSYCHIATRY & NEUROLOGY

## 2017-03-23 PROCEDURE — 25000128 H RX IP 250 OP 636: Performed by: STUDENT IN AN ORGANIZED HEALTH CARE EDUCATION/TRAINING PROGRAM

## 2017-03-23 PROCEDURE — 25000125 ZZHC RX 250: Performed by: STUDENT IN AN ORGANIZED HEALTH CARE EDUCATION/TRAINING PROGRAM

## 2017-03-23 PROCEDURE — 25500064 ZZH RX 255 OP 636: Performed by: PSYCHIATRY & NEUROLOGY

## 2017-03-23 PROCEDURE — 70553 MRI BRAIN STEM W/O & W/DYE: CPT

## 2017-03-23 RX ORDER — PREDNISONE 50 MG/1
1250 TABLET ORAL DAILY
Qty: 125 TABLET | Refills: 0 | Status: ON HOLD | OUTPATIENT
Start: 2017-03-23 | End: 2017-03-26

## 2017-03-23 RX ORDER — GADOBUTROL 604.72 MG/ML
10 INJECTION INTRAVENOUS ONCE
Status: COMPLETED | OUTPATIENT
Start: 2017-03-23 | End: 2017-03-23

## 2017-03-23 RX ORDER — FENTANYL CITRATE 50 UG/ML
25-50 INJECTION, SOLUTION INTRAMUSCULAR; INTRAVENOUS EVERY 5 MIN PRN
Status: DISCONTINUED | OUTPATIENT
Start: 2017-03-23 | End: 2017-03-23 | Stop reason: HOSPADM

## 2017-03-23 RX ORDER — FLUMAZENIL 0.1 MG/ML
0.2 INJECTION, SOLUTION INTRAVENOUS
Status: DISCONTINUED | OUTPATIENT
Start: 2017-03-23 | End: 2017-03-23 | Stop reason: HOSPADM

## 2017-03-23 RX ORDER — SODIUM CHLORIDE 9 MG/ML
INJECTION, SOLUTION INTRAVENOUS CONTINUOUS
Status: DISCONTINUED | OUTPATIENT
Start: 2017-03-23 | End: 2017-03-23 | Stop reason: HOSPADM

## 2017-03-23 RX ORDER — LIDOCAINE 40 MG/G
CREAM TOPICAL
Status: DISCONTINUED | OUTPATIENT
Start: 2017-03-23 | End: 2017-03-23 | Stop reason: HOSPADM

## 2017-03-23 RX ORDER — NALOXONE HYDROCHLORIDE 0.4 MG/ML
.1-.4 INJECTION, SOLUTION INTRAMUSCULAR; INTRAVENOUS; SUBCUTANEOUS
Status: DISCONTINUED | OUTPATIENT
Start: 2017-03-23 | End: 2017-03-23 | Stop reason: HOSPADM

## 2017-03-23 RX ADMIN — SODIUM CHLORIDE: 9 INJECTION, SOLUTION INTRAVENOUS at 09:26

## 2017-03-23 RX ADMIN — MIDAZOLAM HYDROCHLORIDE 2 MG: 1 INJECTION, SOLUTION INTRAMUSCULAR; INTRAVENOUS at 10:33

## 2017-03-23 RX ADMIN — FENTANYL CITRATE 100 MCG: 50 INJECTION, SOLUTION INTRAMUSCULAR; INTRAVENOUS at 10:33

## 2017-03-23 RX ADMIN — GADOBUTROL 10 ML: 604.72 INJECTION INTRAVENOUS at 09:57

## 2017-03-23 NOTE — DISCHARGE INSTRUCTIONS
[facility]  Going Home after Sedation      For 24 hours:    An adult should stay with you.    Relax and take it easy.    DO NOT make any important legal decisions.    DO NOT drive or operate machines at home or at work.    Resume your regular diet and drink plenty of fluids.    CALL THE PHYSICIAN IF:    You develop nausea or vomiting    You develop hives or a rash or any unexplained itching    ADDITIONAL INSTRUCTIONS:none    [facility] INTERVENTIONAL RADIOLOGY DEPARTMENT                            Date of procedure:March 23, 2017        Telephone numbers:     [phone]      Monday-Friday 8:00 am to 4:30 pm                                              [phone]       After 4:30 pm Monday-Friday, Weekends & Holidays. Ask for the Interventional Radiologist on call. Someone is  available 24 hours/day        [facility] toll free number: [phone]  Monday-Friday 8:00 am to 4:30 pm

## 2017-03-23 NOTE — PROGRESS NOTES
Returned to unit awake and alert. Eating and taking po fluids well. Still having pain but this is not new. Continue to monitor.

## 2017-03-23 NOTE — TELEPHONE ENCOUNTER
I called Jeff with results of MRI and Dr. Rodríguez's input.    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     Apparently he is interested in receiving steroids for this. The benefit of high dose steroids in the setting of acute MS relapse is that these may hasten resolution of symptoms. There is no convincing evidence that steroids change the long term outcome, on average.     Steroids are frequently associated with nuisance side effects including increased appetite, difficulty speaking and irritable mood. He is a diabetic and steroids will also make his blood sugars go up. He should check blood sugar at least twice daily while on steroids and contact his PCP if glucose values are >300.     Serious side effects of steroids include inflammation of the pancreas and a bone condition called avascular necrosis that most commonly affects the hips; these are rare and more typically associated with chronic steroid use.     If he wants to proceed with steroids, I would recommend prednisone 1250 mg daily x 5 days (prescribed as prednisone 50 mg, quantity 125, with directions to take 25 daily). If he preferred IV steroids for whatever reason, the alternative would be methylprednisolone 1 gram IV daily x 5 days.    Jeff would like to proceed with the steroids. I placed an order for the prednisone to his [facility]'s pharmacy in [place] per Dr. Rodríguez. I called [facility]'s to confirm that this prescription was correct. Jeff will also contact his PCP to obtain a blood glucometer to check his blood glucose, as he states he has border line diabetes. He had no further questions or concerns and says he will see us next week.    Yana Vick, MS RN Care Coordinator

## 2017-03-23 NOTE — IP AVS SNAPSHOT
[address] [address]                                       After Visit Summary   3/23/2017    Jeff Lewis    MRN: [id number]           After Visit Summary Signature Page     I have received my discharge instructions, and my questions have been answered. I have discussed any challenges I see with this plan with the nurse or doctor.    ..........................................................................................................................................  Patient/Patient Representative Signature      ..........................................................................................................................................  Patient Representative Print Name and Relationship to Patient    ..................................................               ................................................  Date                                            Time    ..........................................................................................................................................  Reviewed by Signature/Title    ...................................................              ..............................................  Date                                                            Time

## 2017-03-23 NOTE — PROGRESS NOTES
Patient tolerated recovery stage well. VSS, Continues to have chronic pain. Patient tolerated PO food and fluids. Teaching was done and discharge instructions were given. Patient ambulated, voided, and PIV was removed. Patient discharged from the hospital ambulatory to the [facility] room where his sister was waiting.

## 2017-03-23 NOTE — PROCEDURES
[facility] [place]     Neuroradiology      Pre-Procedure Sedation Assessment :      3/23/2017 10:01 AM    Expected Level: Minimal Sedation    Indication: Sedation is required for the following type of Procedure: MRI under sedation    Sedation and procedural consent: Risks, benefits and alternatives were discussed with Patient    PO Intake: Appropriately NPO for procedure    ASA Class: Class 1 - HEALTHY PATIENT    Mallampati: Grade 1:  Soft palate, uvula, tonsillar pillars, and posterior pharyngeal wall visible    Lungs: Lungs Clear with good breath sounds bilaterally    Heart: Normal heart sounds and rate    History and physical reviewed and no updates needed. I have reviewed the lab findings, diagnostic data, medications, and the plan for sedation. I have determined this patient to be an appropriate candidate for the planned sedation and procedure and have reassessed the patient IMMEDIATELY PRIOR to sedation and procedure.    Can Ozutemiz

## 2017-03-23 NOTE — IP AVS SNAPSHOT
MRN:[id number]                      After Visit Summary   3/23/2017    Jeff Lewis    MRN: [id number]           Visit Information        Department      3/23/2017  8:36 AM [address] [facility]          Review of your medicines      UNREVIEWED medicines. Ask your doctor about these medicines        Dose / Directions    IBUPROFEN PO        Dose:  600 mg   Take 600 mg by mouth every 6 hours as needed for moderate pain   Refills:  0       Medication given by intrathecal pump   Used for:  Cervicalgia        NO DRUG NEEDED.   Quantity:  1 each   Refills:  0                Protect others around you: Learn how to safely use, store and throw away your medicines at www.disposemymeds.org.         Follow-ups after your visit        Your next 10 appointments already scheduled     Mar 30, 2017  8:00 AM CDT   (Arrive by 7:45 AM)   Return Multiple Sclerosis with Federico Rodríguez MD   [facility] Multiple Sclerosis ([facility])    [address] [place] [address] [phone]               Care Instructions        Further instructions from your care team       [facility]  Going Home after Sedation      For 24 hours:    An adult should stay with you.    Relax and take it easy.    DO NOT make any important legal decisions.    DO NOT drive or operate machines at home or at work.    Resume your regular diet and drink plenty of fluids.    CALL THE PHYSICIAN IF:    You develop nausea or vomiting    You develop hives or a rash or any unexplained itching    ADDITIONAL INSTRUCTIONS:none    [facility] INTERVENTIONAL RADIOLOGY DEPARTMENT                            Date of procedure:March 23, 2017        Telephone numbers:     [phone]      Monday-Friday 8:00 am to 4:30 pm                                              [phone]       After 4:30 pm Monday-Friday, Weekends & Holidays. Ask for the Interventional Radiologist on call. Someone is   "available 24 hours/day        [facility] toll free number: [phone]  Monday-Friday 8:00 am to 4:30 pm                                                                                                                                                                                                                        Additional Information About Your Visit        MyChart Information     [employer] lets you send messages to your doctor, view your test results, renew your prescriptions, schedule appointments and more. To sign up, go to [email or web address] . Click on \"Log in\" on the left side of the screen, which will take you to the Welcome page. Then click on \"Sign up Now\" on the right side of the page.     You will be asked to enter the access code listed below, as well as some personal information. Please follow the directions to create your username and password.     Your access code is: [email or web address]  Expires: 3/26/2017  7:30 AM     Your access code will  in 90 days. If you need help or a new code, please call your [facility] clinic or [phone].        Care EveryWhere ID     This is your Care EveryWhere ID. This could be used by other organizations to access your [facility] medical records  [id number]        Your Vitals Were     Blood Pressure Pulse Temperature Respirations Height Weight    121/59 68 97.9  F (36.6  C) (Oral) 16 1.854 m (6' 1\") 117.9 kg (260 lb)    Pulse Oximetry BMI (Body Mass Index)                96% 34.3 kg/m2           Primary Care Provider Office Phone # Fax #    Sukumar España [phone]      Thank you!     Thank you for choosing [facility] for your care. Our goal is always to provide you with excellent care. Hearing back from our patients is one way we can continue to improve our services. Please take a few minutes to complete the written survey that you may receive in the mail after you visit with us. Thank you!             Medication List: This is a list of all " your medications and when to take them. Check marks below indicate your daily home schedule. Keep this list as a reference.      Medications           Morning Afternoon Evening Bedtime As Needed    IBUPROFEN PO   Take 600 mg by mouth every 6 hours as needed for moderate pain                                Medication given by intrathecal pump   NO DRUG NEEDED.

## 2017-03-24 PROBLEM — G37.9 CNS DEMYELINATING DISEASE (H): Status: ACTIVE | Noted: 2017-03-24

## 2017-03-24 NOTE — TELEPHONE ENCOUNTER
IV methylprednisolone therapy plan placed per Dr. Rodríguez, 1 gram IV daily x 5 days. I called Jeff to let him know this plan. I also provided him with the number to [facility] scheduling (phone [phone]). Jeff had no further questions or concerns.    Yana Vick MS RN Care Coordinator

## 2017-03-24 NOTE — TELEPHONE ENCOUNTER
Dr. Rodríguez, I received the following message from the triage nurse:    Pt unable to swallow food, pills due to lesions-can he have a different admin to get med into his body-Liquid,IV? He has no trouble swallowing water.    Are you okay with the IV steroids instead, and if so I will call Jeff to see if he took the 1250 mg yesterday. Thank you.    Yana Vick, MS RN Care Coordinator

## 2017-03-24 NOTE — TELEPHONE ENCOUNTER
Yes, we can proceed with IV methylprednisolone 1 gram IV daily x 5 days. Should probably try to schedule through the infusion center at [facility], hopefully we can get this going over the weekend.

## 2017-03-25 ENCOUNTER — INFUSION THERAPY VISIT (OUTPATIENT)
Dept: INFUSION THERAPY | Facility: CLINIC | Age: 57
End: 2017-03-25
Attending: PSYCHIATRY & NEUROLOGY
Payer: COMMERCIAL

## 2017-03-25 ENCOUNTER — HOSPITAL ENCOUNTER (OUTPATIENT)
Facility: CLINIC | Age: 57
Setting detail: OBSERVATION
Discharge: HOME | End: 2017-03-27
Attending: EMERGENCY MEDICINE | Admitting: HOSPITALIST
Payer: COMMERCIAL

## 2017-03-25 VITALS
OXYGEN SATURATION: 98 % | RESPIRATION RATE: 16 BRPM | DIASTOLIC BLOOD PRESSURE: 76 MMHG | TEMPERATURE: 96.1 F | SYSTOLIC BLOOD PRESSURE: 132 MMHG | HEART RATE: 58 BPM

## 2017-03-25 DIAGNOSIS — G37.9 CNS DEMYELINATING DISEASE (H): Primary | ICD-10-CM

## 2017-03-25 DIAGNOSIS — R19.8 GI PROBLEM: Primary | ICD-10-CM

## 2017-03-25 DIAGNOSIS — R73.9 HYPERGLYCEMIA: ICD-10-CM

## 2017-03-25 DIAGNOSIS — R79.89 ELEVATED LACTIC ACID LEVEL: ICD-10-CM

## 2017-03-25 LAB — GLUCOSE BLDC GLUCOMTR-MCNC: 387 MG/DL (ref 70–99)

## 2017-03-25 PROCEDURE — 99207 ZZC CDG-CODE CATEGORY CHANGED: CPT | Performed by: HOSPITALIST

## 2017-03-25 PROCEDURE — 99285 EMERGENCY DEPT VISIT HI MDM: CPT | Mod: Z6 | Performed by: EMERGENCY MEDICINE

## 2017-03-25 PROCEDURE — 99219 ZZC INITIAL OBSERVATION CARE,LEVL II: CPT | Performed by: HOSPITALIST

## 2017-03-25 PROCEDURE — 99285 EMERGENCY DEPT VISIT HI MDM: CPT | Mod: 25 | Performed by: EMERGENCY MEDICINE

## 2017-03-25 PROCEDURE — 00000146 ZZHCL STATISTIC GLUCOSE BY METER IP

## 2017-03-25 RX ADMIN — SODIUM CHLORIDE 50 ML: 9 INJECTION, SOLUTION INTRAVENOUS at 13:21

## 2017-03-25 RX ADMIN — SODIUM CHLORIDE 1000 MG: 9 INJECTION, SOLUTION INTRAVENOUS at 12:14

## 2017-03-25 NOTE — LETTER
Transition Communication Hand-off for Care Transitions to Next Level of Care Provider    Name: Jeff Lewis  MRN #: [id number]  Primary Care Provider: Sukumar España     [facility] [address] [place] [address]     Reason for Hospitalization:  Hyperglycemia [R73.9]  Elevated lactic acid level [E87.2]  Admit Date/Time: 3/25/2017 11:26 PM  Discharge Date: 3/27/17  Payor Source: Payor: [employer] / Plan: [employer] OPEN ACCESS FULLY INSURED / Product Type: HMO /     Readmission Assessment Measure (DICKSON) Risk Score/category: Low    Reason for Communication Hand-off Referral: Multiple providers/specialties    Discharge Plan: Home       Concern for non-adherence with plan of care:   Y/N no  Follow-up specialty is recommended: Yes    Follow-up plan:  Future Appointments  Date Time Provider Department Center   3/[facility]2017 4:00 PM [facility] [place]   3/29/2017 3:00 PM [facility]   3/30/2017 8:00 AM Federico Rodríguez MD [facility]       Key Recommendations:  See AVS    Eunice Eubanks RN, BSN  Care Coordinator [address]  Pager: [phone]  Phone: [phone]    AVS/Discharge Summary is the source of truth; this is a helpful guide for improved communication of patient story

## 2017-03-25 NOTE — PATIENT INSTRUCTIONS
Patient Education    Methylprednisolone Acetate Suspension for injection    Methylprednisolone Oral tablet    Methylprednisolone Sodium Succinate Solution for injection  Methylprednisolone Sodium Succinate Solution for injection  What is this medicine?  METHYLPREDNISOLONE (meth ill pred NISS oh lone) is a corticosteroid. It is commonly used to treat inflammation of the skin, joints, lungs, and other organs. Common conditions treated include asthma, allergies, and arthritis. It is also used for other conditions, such as blood disorders and diseases of the adrenal glands.  This medicine may be used for other purposes; ask your health care provider or pharmacist if you have questions.  What should I tell my health care provider before I take this medicine?  They need to know if you have any of these conditions:    cataracts or glaucoma    Cushing's syndrome    heart disease    high blood pressure    infection including tuberculosis    low calcium or potassium levels in the blood    recent surgery    seizures    stomach or intestinal disease, including colitis    threadworms    thyroid problems    an unusual or allergic reaction to methylprednisolone, corticosteroids, benzyl alcohol, other medicines, foods, dyes, or preservatives    pregnant or trying to get pregnant    breast-feeding  How should I use this medicine?  This medicine is for injection or infusion into a vein. It is also for injection into a muscle. It is given by a health care professional in a hospital or clinic setting.  Talk to your pediatrician regarding the use of this medicine in children. While this drug may be prescribed for selected conditions, precautions do apply.  Overdosage: If you think you have taken too much of this medicine contact a poison control center or emergency room at once.  NOTE: This medicine is only for you. Do not share this medicine with others.  What if I miss a dose?  This does not apply.  What may interact with this  medicine?  Do not take this medicine with any of the following medications:    mifepristone  This medicine may also interact with the following medications:    aspirin and aspirin-like medicines    cyclosporin    ketoconazole    phenobarbital    phenytoin    rifampin    tacrolimus    troleandomycin    vaccines    warfarin  This list may not describe all possible interactions. Give your health care provider a list of all the medicines, herbs, non-prescription drugs, or dietary supplements you use. Also tell them if you smoke, drink alcohol, or use illegal drugs. Some items may interact with your medicine.  What should I watch for while using this medicine?  Visit your doctor or health care professional for regular checks on your progress. If you are taking this medicine for a long time, carry an identification card with your name and address, the type and dose of your medicine, and your doctor's name and address.  The medicine may increase your risk of getting an infection. Stay away from people who are sick. Tell your doctor or health care professional if you are around anyone with measles or chickenpox.  You may need to avoid some vaccines. Talk to your health care provider for more information.  If you are going to have surgery, tell your doctor or health care professional that you have taken this medicine within the last twelve months.  Ask your doctor or health care professional about your diet. You may need to lower the amount of salt you eat.  The medicine can increase your blood sugar. If you are a diabetic check with your doctor if you need help adjusting the dose of your diabetic medicine.  What side effects may I notice from receiving this medicine?  Side effects that you should report to your doctor or health care professional as soon as possible:    allergic reactions like skin rash, itching or hives, swelling of the face, lips, or tongue    bloody or tarry stools    changes in vision    eye pain or  bulging eyes    fever, sore throat, sneezing, cough, or other signs of infection, wounds that will not heal    increased thirst    irregular heartbeat    muscle cramps    pain in hips, back, ribs, arms, shoulders, or legs    swelling of the ankles, feet, hands    trouble passing urine or change in the amount of urine    unusual bleeding or bruising    unusually weak or tired    weight gain or weight loss  Side effects that usually do not require medical attention (report to your doctor or health care professional if they continue or are bothersome):    changes in emotions or moods    constipation or diarrhea    headache    irritation at site where injected    nausea, vomiting    skin problems, acne, thin and shiny skin    trouble sleeping    unusual hair growth on the face or body  This list may not describe all possible side effects. Call your doctor for medical advice about side effects. You may report side effects to FDA at 6-396-FDA-7998.  Where should I keep my medicine?  This drug is given in a hospital or clinic and will not be stored at home.  NOTE:This sheet is a summary. It may not cover all possible information. If you have questions about this medicine, talk to your doctor, pharmacist, or health care provider. Copyright  2016 [employer]

## 2017-03-25 NOTE — IP AVS SNAPSHOT
MRN:[id number]                      After Visit Summary   3/25/2017    Jeff Lewis    MRN: [id number]           Thank you!     Thank you for choosing [facility] for your care. Our goal is always to provide you with excellent care. Hearing back from our patients is one way we can continue to improve our services. Please take a few minutes to complete the written survey that you may receive in the mail after you visit with us. Thank you!        Patient Information     Date Of Birth          1960        About your hospital stay     You were admitted on:  March 26, 2017 You last received care in the:  [address] [facility]    You were discharged on:  March 27, 2017        Reason for your hospital stay       You were admitted for hyperglycemia as a result of being recently started on high dose steroids. You were started on lantus twice daily and scheduled short acting insulin with meals. You were discharged home 3/27.                  Who to Call     For medical emergencies, please call 911.  For non-urgent questions about your medical care, please call your primary care provider or clinic, [phone]          Attending Provider     Provider Specialty    Marcelle Zhou MD Emergency Medicine    Rubio Fall MD Internal Medicine    Jacky Velasquez MD Internal Medicine       Primary Care Provider Office Phone # Fax #    Sukumar España [phone]       [facility] [address]  [facility] [address]         When to contact your care team       Please contact your primary care provider or seek medical care if you develop chest pain, shortness of breath, fever >101F, chills, abdominal pain, blood sugar persistently >350, or if any other concerns arise.                  After Care Instructions     Activity       Your activity upon discharge: activity as tolerated            Diet       Follow this diet upon discharge: regular diet             Discharge Instructions       Please take your insulin as prescribed, only take lantus and novolog on the days you are getting the high dose steroid. Do not take the insulin on the days you are not getting steroids, as this can cause your blood sugar to drop too low.  Please hold off on restarting your metformin until you follow up with your primary care provider            Monitor and record       Monitor your blood sugar 4 times daily  before meals and at bedtime)                  Follow-up Appointments     Adult [facility] Follow-up and recommended labs and tests       Follow up with primary care provider on 3/30 or 3/31  Follow up with your MS specialist as you have had scheduled    Appointments on [place] and/or [facility] (with [facility] or [facility] provider or service). Call [phone] if you haven't heard regarding these appointments within 7 days of discharge.                  Your next 10 appointments already scheduled     Mar 28, 2017  4:00 PM CDT   Infusion 120 with UC SPEC INFUSION, UC 43 ATC   [facility] Specialty and Procedure ([facility])    [address]  [address]  [facility] [address] [phone]            Mar 29, 2017  3:00 PM CDT   Infusion 120 with UC SPEC INFUSION, UC 43 ATC   [facility] Specialty and Procedure ([facility])    [address]  [address]  [facility] [address] [phone]            Mar 30, 2017  8:00 AM CDT   (Arrive by 7:45 AM)   Return Multiple Sclerosis with Federico Rodríguez MD   [facility] Multiple Sclerosis ([facility])    [address]  3rd Floor  [facility] [address] [phone]              Further instructions from your care team       You were already scheduled for Thursday 3/30 at 11:00 am at your Primary Care Clinic.  [facility]  [address]  [place] [address]  Phone #:  "[phone]    Pending Results     Date and Time Order Name Status Description    3/26/2017 0133 Blood culture Preliminary     3/26/2017 0133 Blood culture Preliminary             Statement of Approval     Ordered          17 1313  I have reviewed and agree with all the recommendations and orders detailed in this document.  EFFECTIVE NOW     Approved and electronically signed by:  Karolina Amador PA-C             Admission Information     Date & Time Provider Department Dept. Phone    3/25/2017 Jacky Velasquez MD [address] [facility] [place] [phone]      Your Vitals Were     Blood Pressure Pulse Temperature Respirations Height Weight    122/61 (BP Location: Left arm) 83 97.6  F (36.4  C) (Oral) 18 1.854 m (6' 1\") 123.3 kg (271 lb 14.4 oz)    Pulse Oximetry BMI (Body Mass Index)                95% 35.87 kg/m2          [employer] Information     [employer] lets you send messages to your doctor, view your test results, renew your prescriptions, schedule appointments and more. To sign up, go to [email or web address] . Click on \"Log in\" on the left side of the screen, which will take you to the Welcome page. Then click on \"Sign up Now\" on the right side of the page.     You will be asked to enter the access code listed below, as well as some personal information. Please follow the directions to create your username and password.     Your access code is: [email or web address]  Expires: 2017  6:30 AM     Your access code will  in 90 days. If you need help or a new code, please call your [place] clinic or [phone].        Care EveryWhere ID     This is your Care EveryWhere ID. This could be used by other organizations to access your [place] medical records  [id number]           Review of your medicines      START taking        Dose / Directions    famotidine 10 MG tablet   Commonly known as:  PEPCID   Used for:  GI problem        Dose:  10 mg   Take 1 tablet (10 mg) by mouth 2 times daily   Refills:  0 "       insulin glargine 100 UNIT/ML injection   Commonly known as:  LANTUS        Dose:  12 Units   Inject 12 Units Subcutaneous 2 times daily for 2 days   Quantity:  3 mL   Refills:  0       * insulin lispro 100 UNIT/ML injection   Commonly known as:  HumaLOG KWIKpen   Notes to Patient:  MEAL COVERAGE INSULIN. IF YOU DO NOT EAT DON'T TAKE THIS.  Give 5 minutes before meal or immediately after.  Notify physician if glucose is greater than 350 after administration of Correction dose of humalog.          Dose:  8 Units   Inject 8 Units Subcutaneous 3 times daily (before meals)   Quantity:  3 mL   Refills:  0       * HumaLOG KWIKpen 100 UNIT/ML injection   Generic drug:  insulin lispro   Notes to Patient:  Do Not give Correction Insulin if Pre-Meal BG less than 140.   For Pre-Meal  - 189 give 1 unit.   For Pre-Meal  - 239 give 2 units.   For Pre-Meal  - 289 give 3 units.   For Pre-Meal  - 339 give 4 units.   For Pre-Meal - 399 give 5 units.   For Pre-Meal -449 give 6 units  For Pre-Meal BG greater than or equal to 450 give 7 units.           Refills:  0       methylPREDNISolone sodium succinate 1,000 mg        Dose:  1000 mg   Start taking on:  3/28/2017   Inject 1,000 mg into the vein every 24 hours   Refills:  0       * Notice:  This list has 2 medication(s) that are the same as other medications prescribed for you. Read the directions carefully, and ask your doctor or other care provider to review them with you.      CONTINUE these medicines which may have CHANGED, or have new prescriptions. If we are uncertain of the size of tablets/capsules you have at home, strength may be listed as something that might have changed.        Dose / Directions    Medication given by intrathecal pump   This may have changed:  additional instructions   Used for:  Cervicalgia        NO DRUG NEEDED.   Quantity:  1 each   Refills:  0         CONTINUE these medicines which have NOT CHANGED        Dose /  Directions    IBUPROFEN PO        Dose:  600 mg   Take 600 mg by mouth every 6 hours as needed for moderate pain   Refills:  0         STOP taking     metFORMIN 500 MG 24 hr tablet   Commonly known as:  GLUCOPHAGE-XR                Where to get your medicines      These medications were sent to [facility] - [place], [place] - [address]  [address], [facility] [address]     Phone:  [phone]     insulin glargine 100 UNIT/ML injection    insulin lispro 100 UNIT/ML injection                Protect others around you: Learn how to safely use, store and throw away your medicines at www.disposemymeds.org.             Medication List: This is a list of all your medications and when to take them. Check marks below indicate your daily home schedule. Keep this list as a reference.      Medications           Morning Afternoon Evening Bedtime As Needed    famotidine 10 MG tablet   Commonly known as:  PEPCID   Take 1 tablet (10 mg) by mouth 2 times daily   Last time this was given:  10 mg on 3/26/2017  9:19 AM                                      IBUPROFEN PO   Take 600 mg by mouth every 6 hours as needed for moderate pain                                insulin glargine 100 UNIT/ML injection   Commonly known as:  LANTUS   Inject 12 Units Subcutaneous 2 times daily for 2 days   Last time this was given:  12 Units on 3/27/2017  8:26 AM                                   * insulin lispro 100 UNIT/ML injection   Commonly known as:  HumaLOG KWIKpen   Inject 8 Units Subcutaneous 3 times daily (before meals)   Notes to Patient:  MEAL COVERAGE INSULIN. IF YOU DO NOT EAT DON'T TAKE THIS.  Give 5 minutes before meal or immediately after.  Notify physician if glucose is greater than 350 after administration of Correction dose of humalog.              With Meal       With meal         With meal.               * HumaLOG KWIKpen 100 UNIT/ML injection   Generic drug:  insulin lispro   Notes to Patient:  Do  Not give Correction Insulin if Pre-Meal BG less than 140.   For Pre-Meal  - 189 give 1 unit.   For Pre-Meal  - 239 give 2 units.   For Pre-Meal  - 289 give 3 units.   For Pre-Meal  - 339 give 4 units.   For Pre-Meal - 399 give 5 units.   For Pre-Meal -449 give 6 units  For Pre-Meal BG greater than or equal to 450 give 7 units.               8am       12pm           6pm.               Medication given by intrathecal pump   NO DRUG NEEDED.                                methylPREDNISolone sodium succinate 1,000 mg   Inject 1,000 mg into the vein every 24 hours   Start taking on:  3/28/2017   Last time this was given:  1,000 mg on 3/27/2017  8:26 AM                                   * Notice:  This list has 2 medication(s) that are the same as other medications prescribed for you. Read the directions carefully, and ask your doctor or other care provider to review them with you.

## 2017-03-25 NOTE — MR AVS SNAPSHOT
After Visit Summary   3/25/2017    Jeff Lewis    MRN: [id number]           Patient Information     Date Of Birth          1960        Visit Information        Provider Department      3/25/2017 12:00 PM UC 49 ATC; UC SPEC INFUSION [facility] Specialty and Procedure        Today's Diagnoses     CNS demyelinating disease (H)    -  1      Care Instructions      Patient Education    Methylprednisolone Acetate Suspension for injection    Methylprednisolone Oral tablet    Methylprednisolone Sodium Succinate Solution for injection  Methylprednisolone Sodium Succinate Solution for injection  What is this medicine?  METHYLPREDNISOLONE (meth ill pred NISS oh lone) is a corticosteroid. It is commonly used to treat inflammation of the skin, joints, lungs, and other organs. Common conditions treated include asthma, allergies, and arthritis. It is also used for other conditions, such as blood disorders and diseases of the adrenal glands.  This medicine may be used for other purposes; ask your health care provider or pharmacist if you have questions.  What should I tell my health care provider before I take this medicine?  They need to know if you have any of these conditions:    cataracts or glaucoma    Cushing's syndrome    heart disease    high blood pressure    infection including tuberculosis    low calcium or potassium levels in the blood    recent surgery    seizures    stomach or intestinal disease, including colitis    threadworms    thyroid problems    an unusual or allergic reaction to methylprednisolone, corticosteroids, benzyl alcohol, other medicines, foods, dyes, or preservatives    pregnant or trying to get pregnant    breast-feeding  How should I use this medicine?  This medicine is for injection or infusion into a vein. It is also for injection into a muscle. It is given by a health care professional in a hospital or clinic setting.  Talk to your pediatrician  regarding the use of this medicine in children. While this drug may be prescribed for selected conditions, precautions do apply.  Overdosage: If you think you have taken too much of this medicine contact a poison control center or emergency room at once.  NOTE: This medicine is only for you. Do not share this medicine with others.  What if I miss a dose?  This does not apply.  What may interact with this medicine?  Do not take this medicine with any of the following medications:    mifepristone  This medicine may also interact with the following medications:    aspirin and aspirin-like medicines    cyclosporin    ketoconazole    phenobarbital    phenytoin    rifampin    tacrolimus    troleandomycin    vaccines    warfarin  This list may not describe all possible interactions. Give your health care provider a list of all the medicines, herbs, non-prescription drugs, or dietary supplements you use. Also tell them if you smoke, drink alcohol, or use illegal drugs. Some items may interact with your medicine.  What should I watch for while using this medicine?  Visit your doctor or health care professional for regular checks on your progress. If you are taking this medicine for a long time, carry an identification card with your name and address, the type and dose of your medicine, and your doctor's name and address.  The medicine may increase your risk of getting an infection. Stay away from people who are sick. Tell your doctor or health care professional if you are around anyone with measles or chickenpox.  You may need to avoid some vaccines. Talk to your health care provider for more information.  If you are going to have surgery, tell your doctor or health care professional that you have taken this medicine within the last twelve months.  Ask your doctor or health care professional about your diet. You may need to lower the amount of salt you eat.  The medicine can increase your blood sugar. If you are a diabetic  check with your doctor if you need help adjusting the dose of your diabetic medicine.  What side effects may I notice from receiving this medicine?  Side effects that you should report to your doctor or health care professional as soon as possible:    allergic reactions like skin rash, itching or hives, swelling of the face, lips, or tongue    bloody or tarry stools    changes in vision    eye pain or bulging eyes    fever, sore throat, sneezing, cough, or other signs of infection, wounds that will not heal    increased thirst    irregular heartbeat    muscle cramps    pain in hips, back, ribs, arms, shoulders, or legs    swelling of the ankles, feet, hands    trouble passing urine or change in the amount of urine    unusual bleeding or bruising    unusually weak or tired    weight gain or weight loss  Side effects that usually do not require medical attention (report to your doctor or health care professional if they continue or are bothersome):    changes in emotions or moods    constipation or diarrhea    headache    irritation at site where injected    nausea, vomiting    skin problems, acne, thin and shiny skin    trouble sleeping    unusual hair growth on the face or body  This list may not describe all possible side effects. Call your doctor for medical advice about side effects. You may report side effects to FDA at 1-395-FDA-9978.  Where should I keep my medicine?  This drug is given in a hospital or clinic and will not be stored at home.  NOTE:This sheet is a summary. It may not cover all possible information. If you have questions about this medicine, talk to your doctor, pharmacist, or health care provider. Copyright  2016 Gold Standard              Follow-ups after your visit        Your next 10 appointments already scheduled     Mar 25, 2017 12:00 PM CDT   Infusion 120 with UC SPEC INFUSION, UC 49 ATC   [facility] Specialty and Procedure ([facility])     [address] [address]   [phone]            Mar 26, 2017 12:00 PM CDT   Infusion 120 with UC SPEC INFUSION, UC 46 ATC   [facility] Specialty and Procedure ([facility])    [address] [address]   [phone]            Mar 27, 2017  3:00 PM CDT   Infusion 120 with UC SPEC INFUSION, UC 44 ATC   [facility] Specialty and Procedure ([facility])    [address] [place] [address]   [phone]            Mar 28, 2017  4:00 PM CDT   Infusion 120 with UC SPEC INFUSION, UC 43 ATC   [facility] Specialty and Procedure ([facility])    [address] [place] [address]   [phone]            Mar 29, 2017  3:00 PM CDT   Infusion 120 with UC SPEC INFUSION, UC 43 ATC   [facility] Specialty and Procedure ([facility])    [address] [place] [address]   [phone]              Who to contact     If you have questions or need follow up information about today's clinic visit or your schedule please contact [facility] SPECIALTY AND PROCEDURE directly at [phone].  Normal or non-critical lab and imaging results will be communicated to you by MyChart, letter or phone within 4 business days after the clinic has received the results. If you do not hear from us within 7 days, please contact the clinic through MyChart or phone. If you have a critical or abnormal lab result, we will notify you by phone as soon as possible.  Submit refill requests through [employer] or call your pharmacy and they will forward the refill request to us. Please allow 3 business days for your refill to be completed.          Additional Information About Your Visit        [employer]  "Information     [facility] lets you send messages to your doctor, view your test results, renew your prescriptions, schedule appointments and more. To sign up, go to [email or web address] . Click on \"Log in\" on the left side of the screen, which will take you to the Welcome page. Then click on \"Sign up Now\" on the right side of the page.     You will be asked to enter the access code listed below, as well as some personal information. Please follow the directions to create your username and password.     Your access code is: [email or web address]  Expires: 3/26/2017  7:30 AM     Your access code will  in 90 days. If you need help or a new code, please call your [place] clinic or [phone].        Care EveryWhere ID     This is your Care EveryWhere ID. This could be used by other organizations to access your [place] medical records  [id number]         Blood Pressure from Last 3 Encounters:   17 121/68   17 143/82   02/15/17 131/79    Weight from Last 3 Encounters:   17 117.9 kg (260 lb)   02/15/17 122.3 kg (269 lb 9.6 oz)   16 122.8 kg (270 lb 12.8 oz)              Today, you had the following     No orders found for display         Today's Medication Changes          These changes are accurate as of: 3/25/17 11:43 AM.  If you have any questions, ask your nurse or doctor.               These medicines have changed or have updated prescriptions.        Dose/Directions    Medication given by intrathecal pump   This may have changed:  additional instructions   Used for:  Cervicalgia        NO DRUG NEEDED.   Quantity:  1 each   Refills:  0                Primary Care Provider Office Phone # Fax #    Sukumarsteve España [phone]       [facility] [address]  [facility] [place] [place] [facility] [address]        Thank you!     Thank you for choosing [facility] SPECIALTY AND PROCEDURE  for your care. Our goal is always to provide you with excellent care. " Hearing back from our patients is one way we can continue to improve our services. Please take a few minutes to complete the written survey that you may receive in the mail after your visit with us. Thank you!             Your Updated Medication List - Protect others around you: Learn how to safely use, store and throw away your medicines at www.disposemymeds.org.          This list is accurate as of: 3/25/17 11:43 AM.  Always use your most recent med list.                   Brand Name Dispense Instructions for use    IBUPROFEN PO      Take 600 mg by mouth every 6 hours as needed for moderate pain       Medication given by intrathecal pump     1 each    NO DRUG NEEDED.       predniSONE 50 MG tablet    DELTASONE    125 tablet    Take 25 tablets (1,250 mg) by mouth daily for 5 days

## 2017-03-25 NOTE — IP AVS SNAPSHOT
[address] [address]    Phone:  [phone]                                       After Visit Summary   3/25/2017    Jeff Lewis    MRN: [id number]           After Visit Summary Signature Page     I have received my discharge instructions, and my questions have been answered. I have discussed any challenges I see with this plan with the nurse or doctor.    ..........................................................................................................................................  Patient/Patient Representative Signature      ..........................................................................................................................................  Patient Representative Print Name and Relationship to Patient    ..................................................               ................................................  Date                                            Time    ..........................................................................................................................................  Reviewed by Signature/Title    ...................................................              ..............................................  Date                                                            Time

## 2017-03-26 PROBLEM — R73.9 HYPERGLYCEMIA: Status: ACTIVE | Noted: 2017-03-26

## 2017-03-26 LAB
ALBUMIN SERPL-MCNC: 3.6 G/DL (ref 3.4–5)
ALBUMIN UR-MCNC: NEGATIVE MG/DL
ALP SERPL-CCNC: 136 U/L (ref 40–150)
ALT SERPL W P-5'-P-CCNC: 21 U/L (ref 0–70)
ANION GAP SERPL CALCULATED.3IONS-SCNC: 11 MMOL/L (ref 3–14)
APPEARANCE UR: CLEAR
AST SERPL W P-5'-P-CCNC: 13 U/L (ref 0–45)
BASOPHILS # BLD AUTO: 0 10E9/L (ref 0–0.2)
BASOPHILS NFR BLD AUTO: 0 %
BILIRUB SERPL-MCNC: 0.5 MG/DL (ref 0.2–1.3)
BILIRUB UR QL STRIP: NEGATIVE
BUN SERPL-MCNC: 20 MG/DL (ref 7–30)
CALCIUM SERPL-MCNC: 9.2 MG/DL (ref 8.5–10.1)
CHLORIDE SERPL-SCNC: 104 MMOL/L (ref 94–109)
CO2 SERPL-SCNC: 22 MMOL/L (ref 20–32)
COLOR UR AUTO: ABNORMAL
CREAT SERPL-MCNC: 0.85 MG/DL (ref 0.66–1.25)
DIFFERENTIAL METHOD BLD: ABNORMAL
EOSINOPHIL # BLD AUTO: 0 10E9/L (ref 0–0.7)
EOSINOPHIL NFR BLD AUTO: 0 %
ERYTHROCYTE [DISTWIDTH] IN BLOOD BY AUTOMATED COUNT: 13.3 % (ref 10–15)
GFR SERPL CREATININE-BSD FRML MDRD: ABNORMAL ML/MIN/1.7M2
GLUCOSE SERPL-MCNC: 361 MG/DL (ref 70–99)
GLUCOSE UR STRIP-MCNC: >1000 MG/DL
HCT VFR BLD AUTO: 45.5 % (ref 40–53)
HGB BLD-MCNC: 16.1 G/DL (ref 13.3–17.7)
HGB UR QL STRIP: ABNORMAL
IMM GRANULOCYTES # BLD: 0 10E9/L (ref 0–0.4)
IMM GRANULOCYTES NFR BLD: 0.3 %
INR PPP: 1.12 (ref 0.86–1.14)
KETONES UR STRIP-MCNC: 10 MG/DL
LACTATE BLD-SCNC: 2.7 MMOL/L (ref 0.7–2.1)
LACTATE BLD-SCNC: 3 MMOL/L (ref 0.7–2.1)
LACTATE BLD-SCNC: 3.3 MMOL/L (ref 0.7–2.1)
LEUKOCYTE ESTERASE UR QL STRIP: NEGATIVE
LYMPHOCYTES # BLD AUTO: 1.2 10E9/L (ref 0.8–5.3)
LYMPHOCYTES NFR BLD AUTO: 11 %
MAGNESIUM SERPL-MCNC: 2 MG/DL (ref 1.6–2.3)
MCH RBC QN AUTO: 32 PG (ref 26.5–33)
MCHC RBC AUTO-ENTMCNC: 35.4 G/DL (ref 31.5–36.5)
MCV RBC AUTO: 91 FL (ref 78–100)
MONOCYTES # BLD AUTO: 0.1 10E9/L (ref 0–1.3)
MONOCYTES NFR BLD AUTO: 0.9 %
NEUTROPHILS # BLD AUTO: 9.5 10E9/L (ref 1.6–8.3)
NEUTROPHILS NFR BLD AUTO: 87.8 %
NITRATE UR QL: NEGATIVE
NRBC # BLD AUTO: 0 10*3/UL
NRBC BLD AUTO-RTO: 0 /100
PH UR STRIP: 5 PH (ref 5–7)
PHOSPHATE SERPL-MCNC: 2.4 MG/DL (ref 2.5–4.5)
PLATELET # BLD AUTO: 208 10E9/L (ref 150–450)
POTASSIUM SERPL-SCNC: 4.1 MMOL/L (ref 3.4–5.3)
PROT SERPL-MCNC: 7.8 G/DL (ref 6.8–8.8)
RBC # BLD AUTO: 5.03 10E12/L (ref 4.4–5.9)
RBC #/AREA URNS AUTO: 0 /HPF (ref 0–2)
SODIUM SERPL-SCNC: 137 MMOL/L (ref 133–144)
SP GR UR STRIP: 1.03 (ref 1–1.03)
SQUAMOUS #/AREA URNS AUTO: 2 /HPF (ref 0–1)
URN SPEC COLLECT METH UR: ABNORMAL
UROBILINOGEN UR STRIP-MCNC: NORMAL MG/DL (ref 0–2)
WBC # BLD AUTO: 10.8 10E9/L (ref 4–11)
WBC #/AREA URNS AUTO: <1 /HPF (ref 0–2)

## 2017-03-26 PROCEDURE — 80053 COMPREHEN METABOLIC PANEL: CPT | Performed by: EMERGENCY MEDICINE

## 2017-03-26 PROCEDURE — 83605 ASSAY OF LACTIC ACID: CPT | Mod: 91

## 2017-03-26 PROCEDURE — 83605 ASSAY OF LACTIC ACID: CPT | Performed by: EMERGENCY MEDICINE

## 2017-03-26 PROCEDURE — 25000128 H RX IP 250 OP 636: Performed by: HOSPITALIST

## 2017-03-26 PROCEDURE — 25000125 ZZHC RX 250: Performed by: PHYSICIAN ASSISTANT

## 2017-03-26 PROCEDURE — 40000141 ZZH STATISTIC PERIPHERAL IV START W/O US GUIDANCE

## 2017-03-26 PROCEDURE — 85610 PROTHROMBIN TIME: CPT | Performed by: EMERGENCY MEDICINE

## 2017-03-26 PROCEDURE — 83735 ASSAY OF MAGNESIUM: CPT | Performed by: EMERGENCY MEDICINE

## 2017-03-26 PROCEDURE — 83605 ASSAY OF LACTIC ACID: CPT

## 2017-03-26 PROCEDURE — 87040 BLOOD CULTURE FOR BACTERIA: CPT | Mod: 91 | Performed by: EMERGENCY MEDICINE

## 2017-03-26 PROCEDURE — 36415 COLL VENOUS BLD VENIPUNCTURE: CPT | Performed by: PHYSICIAN ASSISTANT

## 2017-03-26 PROCEDURE — G0378 HOSPITAL OBSERVATION PER HR: HCPCS

## 2017-03-26 PROCEDURE — 96372 THER/PROPH/DIAG INJ SC/IM: CPT | Mod: 59

## 2017-03-26 PROCEDURE — 81001 URINALYSIS AUTO W/SCOPE: CPT | Performed by: EMERGENCY MEDICINE

## 2017-03-26 PROCEDURE — 85025 COMPLETE CBC W/AUTO DIFF WBC: CPT | Performed by: EMERGENCY MEDICINE

## 2017-03-26 PROCEDURE — 25000128 H RX IP 250 OP 636: Performed by: PHYSICIAN ASSISTANT

## 2017-03-26 PROCEDURE — 96360 HYDRATION IV INFUSION INIT: CPT | Performed by: EMERGENCY MEDICINE

## 2017-03-26 PROCEDURE — 25000132 ZZH RX MED GY IP 250 OP 250 PS 637: Performed by: HOSPITALIST

## 2017-03-26 PROCEDURE — 00000146 ZZHCL STATISTIC GLUCOSE BY METER IP

## 2017-03-26 PROCEDURE — 82803 BLOOD GASES ANY COMBINATION: CPT

## 2017-03-26 PROCEDURE — 84100 ASSAY OF PHOSPHORUS: CPT | Performed by: EMERGENCY MEDICINE

## 2017-03-26 PROCEDURE — 96361 HYDRATE IV INFUSION ADD-ON: CPT | Performed by: EMERGENCY MEDICINE

## 2017-03-26 PROCEDURE — 25000131 ZZH RX MED GY IP 250 OP 636 PS 637: Performed by: HOSPITALIST

## 2017-03-26 PROCEDURE — 25000128 H RX IP 250 OP 636: Performed by: EMERGENCY MEDICINE

## 2017-03-26 PROCEDURE — 83605 ASSAY OF LACTIC ACID: CPT | Performed by: PHYSICIAN ASSISTANT

## 2017-03-26 PROCEDURE — 96361 HYDRATE IV INFUSION ADD-ON: CPT

## 2017-03-26 PROCEDURE — 96372 THER/PROPH/DIAG INJ SC/IM: CPT | Performed by: EMERGENCY MEDICINE

## 2017-03-26 RX ORDER — SODIUM CHLORIDE 9 MG/ML
INJECTION, SOLUTION INTRAVENOUS CONTINUOUS
Status: DISCONTINUED | OUTPATIENT
Start: 2017-03-26 | End: 2017-03-27

## 2017-03-26 RX ORDER — ACETAMINOPHEN 325 MG/1
650 TABLET ORAL EVERY 4 HOURS PRN
Status: DISCONTINUED | OUTPATIENT
Start: 2017-03-26 | End: 2017-03-27 | Stop reason: HOSPADM

## 2017-03-26 RX ORDER — ONDANSETRON 2 MG/ML
4 INJECTION INTRAMUSCULAR; INTRAVENOUS EVERY 6 HOURS PRN
Status: DISCONTINUED | OUTPATIENT
Start: 2017-03-26 | End: 2017-03-27

## 2017-03-26 RX ORDER — FAMOTIDINE 10 MG
10 TABLET ORAL 2 TIMES DAILY
Status: DISCONTINUED | OUTPATIENT
Start: 2017-03-26 | End: 2017-03-27 | Stop reason: HOSPADM

## 2017-03-26 RX ORDER — NICOTINE POLACRILEX 4 MG
15-30 LOZENGE BUCCAL
Status: DISCONTINUED | OUTPATIENT
Start: 2017-03-26 | End: 2017-03-27 | Stop reason: HOSPADM

## 2017-03-26 RX ORDER — ONDANSETRON 4 MG/1
4 TABLET, ORALLY DISINTEGRATING ORAL EVERY 6 HOURS PRN
Status: DISCONTINUED | OUTPATIENT
Start: 2017-03-26 | End: 2017-03-27 | Stop reason: HOSPADM

## 2017-03-26 RX ORDER — DEXTROSE MONOHYDRATE 25 G/50ML
25-50 INJECTION, SOLUTION INTRAVENOUS
Status: DISCONTINUED | OUTPATIENT
Start: 2017-03-26 | End: 2017-03-27 | Stop reason: HOSPADM

## 2017-03-26 RX ORDER — HEPARIN SODIUM 5000 [USP'U]/.5ML
5000 INJECTION, SOLUTION INTRAVENOUS; SUBCUTANEOUS EVERY 8 HOURS
Status: DISCONTINUED | OUTPATIENT
Start: 2017-03-26 | End: 2017-03-27 | Stop reason: HOSPADM

## 2017-03-26 RX ORDER — OXYCODONE HYDROCHLORIDE 5 MG/1
5-10 TABLET ORAL
Status: DISCONTINUED | OUTPATIENT
Start: 2017-03-26 | End: 2017-03-26

## 2017-03-26 RX ORDER — ONDANSETRON 4 MG/1
4 TABLET, ORALLY DISINTEGRATING ORAL EVERY 6 HOURS PRN
Status: DISCONTINUED | OUTPATIENT
Start: 2017-03-26 | End: 2017-03-27

## 2017-03-26 RX ORDER — NALOXONE HYDROCHLORIDE 0.4 MG/ML
.1-.4 INJECTION, SOLUTION INTRAMUSCULAR; INTRAVENOUS; SUBCUTANEOUS
Status: DISCONTINUED | OUTPATIENT
Start: 2017-03-26 | End: 2017-03-27 | Stop reason: HOSPADM

## 2017-03-26 RX ORDER — METFORMIN HCL 500 MG
500 TABLET, EXTENDED RELEASE 24 HR ORAL
Status: ON HOLD | COMMUNITY
End: 2017-03-27

## 2017-03-26 RX ORDER — ONDANSETRON 2 MG/ML
4 INJECTION INTRAMUSCULAR; INTRAVENOUS EVERY 6 HOURS PRN
Status: DISCONTINUED | OUTPATIENT
Start: 2017-03-26 | End: 2017-03-27 | Stop reason: HOSPADM

## 2017-03-26 RX ADMIN — INSULIN ASPART 4 UNITS: 100 INJECTION, SOLUTION INTRAVENOUS; SUBCUTANEOUS at 16:39

## 2017-03-26 RX ADMIN — SODIUM CHLORIDE: 900 INJECTION, SOLUTION INTRAVENOUS at 00:47

## 2017-03-26 RX ADMIN — INSULIN GLARGINE 7 UNITS: 100 INJECTION, SOLUTION SUBCUTANEOUS at 08:54

## 2017-03-26 RX ADMIN — SODIUM CHLORIDE 1000 MG: 9 INJECTION, SOLUTION INTRAVENOUS at 10:13

## 2017-03-26 RX ADMIN — HEPARIN SODIUM 5000 UNITS: 5000 INJECTION, SOLUTION INTRAVENOUS; SUBCUTANEOUS at 16:34

## 2017-03-26 RX ADMIN — HEPARIN SODIUM 5000 UNITS: 5000 INJECTION, SOLUTION INTRAVENOUS; SUBCUTANEOUS at 23:23

## 2017-03-26 RX ADMIN — POTASSIUM PHOSPHATE, MONOBASIC AND POTASSIUM PHOSPHATE, DIBASIC 15 MMOL: 224; 236 INJECTION, SOLUTION INTRAVENOUS at 19:32

## 2017-03-26 RX ADMIN — HEPARIN SODIUM 5000 UNITS: 5000 INJECTION, SOLUTION INTRAVENOUS; SUBCUTANEOUS at 08:53

## 2017-03-26 RX ADMIN — INSULIN GLARGINE 7 UNITS: 100 INJECTION, SOLUTION SUBCUTANEOUS at 19:29

## 2017-03-26 RX ADMIN — SODIUM CHLORIDE: 900 INJECTION, SOLUTION INTRAVENOUS at 05:12

## 2017-03-26 RX ADMIN — FAMOTIDINE 10 MG: 10 TABLET ORAL at 09:19

## 2017-03-26 RX ADMIN — INSULIN ASPART 3 UNITS: 100 INJECTION, SOLUTION INTRAVENOUS; SUBCUTANEOUS at 09:17

## 2017-03-26 RX ADMIN — SODIUM CHLORIDE 500 ML: 9 INJECTION, SOLUTION INTRAVENOUS at 16:34

## 2017-03-26 RX ADMIN — INSULIN ASPART 2 UNITS: 100 INJECTION, SOLUTION INTRAVENOUS; SUBCUTANEOUS at 12:24

## 2017-03-26 RX ADMIN — SODIUM CHLORIDE 1000 ML: 9 INJECTION, SOLUTION INTRAVENOUS at 01:25

## 2017-03-26 ASSESSMENT — ENCOUNTER SYMPTOMS
DIFFICULTY URINATING: 0
APPETITE CHANGE: 1
EYE REDNESS: 0
ARTHRALGIAS: 0
POLYDIPSIA: 1
HEADACHES: 0
COLOR CHANGE: 0
FEVER: 0
SHORTNESS OF BREATH: 0
ABDOMINAL PAIN: 0
NECK STIFFNESS: 0
CONFUSION: 0
DIAPHORESIS: 1

## 2017-03-26 ASSESSMENT — PAIN DESCRIPTION - DESCRIPTORS
DESCRIPTORS: CONSTANT;SHARP
DESCRIPTORS: CONSTANT;SHARP

## 2017-03-26 ASSESSMENT — VISUAL ACUITY: OU: NORMAL ACUITY

## 2017-03-26 NOTE — PLAN OF CARE
Problem: Goal Outcome Summary  Goal: Goal Outcome Summary  Neuro: A&Ox4. L jaw numbness which extends above L eye- onset 3 weeks ago.   Cardiac: SR with BBB. VSS.       Respiratory: Sating >94% on RA.  GI/: Voids spontaneously without difficulty.  Diet/appetite: Consistent carb diet.  Activity:  SBA with ambulation.  Pain: Chronic R sided neck pain. Intrathecal pain pump.    Skin: Intact, no new deficits noted.     BG trending down, last 282.     R: Continue with POC. Notify primary team with changes.

## 2017-03-26 NOTE — PROGRESS NOTES
CLINICAL NUTRITION SERVICES -   Positive nurse screen for new/uncontrolled diabetes, MD consult for DM2    Diet History:  Pt reports no history of receiving carb counting diet education. Presented with hyperglycemic on admission, related to steroids. Pt with history of MS. Started on insulin. Intake is good. Usually consumes 3 meals per day. Allergic to tree nuts.       Nutrition Diagnosis:  Food- and nutrition-related knowledge deficit r/t no previous knowledge of carb counting diet AEB pt report     Nutrition Prescription/Recs:  Low carb diet while on high dose steroids    Interventions:  Nutrition Education with patient and his wife:   Reviewed carbohydrate sources and serving sizes. Discussed recommendations for carb intakes at meals. Encouraged pt to use carb counting menu guide when ordering meals. Recommended reading labels, eating 3 regular meals per day, spaced 4-5 hours apart. Discussed use of non-carb snacks between meals if desired. Provided [employer] Guide to Carbohydrate Counting.   Time spent in teachin minutes    Goals:    Patient verbalize understanding of carb diet    Follow-up:   Patient to ask any further nutrition-related questions before discharge.  In addition, pt may request outpatient RD appointment.    Cale Saul RD/ANNA  Weekend relief [phone]

## 2017-03-26 NOTE — PLAN OF CARE
Problem: Discharge Planning  Goal: Discharge Planning (Adult, OB, Behavioral, Peds)     OBSERVATION GOALS      Diagnostic tests and consults completed and resulted: PENDING    Vital signs normal or at patient baseline: Yes    Tolerating oral intake to maintain hydration: Yes    BG less than 180 on two consecutive checks: No Glucose 213

## 2017-03-26 NOTE — PROGRESS NOTES
"/83 (BP Location: Right arm)  Pulse 83  Temp 97.6  F (36.4  C) (Oral)  Resp 16  Ht 1.854 m (6' 1\")  Wt 121.1 kg (267 lb)  SpO2 98%  BMI 35.23 kg/m2     Pt arrived on 6B at this time. Oriented to unit, call don't fall, call light within reach. Outpatient Observation brochure given and explained to pt. Will continue to monitor.           "

## 2017-03-26 NOTE — ED PROVIDER NOTES
"    [place] EMERGENCY DEPARTMENT ([facility])  March 25, 2017  ED 18   History     Chief Complaint   Patient presents with     Hyperglycemia     The history is provided by the patient, medical records and the spouse.     Jeff Lewis is a 56 year old male who presents with elevated blood sugars. He has a history of CNS demyelinating disease consistent with multiple sclerosis (diagnosed at age 16) as well as a history of chronic right sided neck pain and distal weakness for which he has an intrathecal pump in place.     He was started on infusions of solumedrol at [facility], first dose was earlier today. He had MRI scan 2 days ago that was remarkable for enhancing lesion in his left brainstem along with left facial numbness. Dr. Rodríguez offered high dose steroids for resolution of these symptoms and patient agreed with this plan. They cautioned him about nuisance side effects like elevated blood sugars. He started prednisone 2 days ago and also obtained a glucometer to watch his blood sugar. He also had an infusion of solumedrol earlier 12 hours ago at [facility]. He was told to check his blood sugar 4 times daily. Tonight he checked his blood sugar and noticed it was elevated to 361. His blood sugar trend was:  221 at 1:30 PM  321 at 9:30 PM  367 at 10:30 PM.  387 at 11:25 PM    He is concerned as the blood sugars are trending upwards. He reports that when he needed aggressive steroid treatment like this a number of years ago he did need to be started on insulin.  His blood sugars are otherwise currently managed on metformin and he does not usually use any insulin type medications.    He now presents for further evaluation. Here he is \"dying of thirst, my MS is killing me.\"  He notes increased thirst for the past few weeks but worsened tonight. He also has diaphoresis. Wife notes he has had decreased appetite. He did eat fruit and whole wheat wheat thins and spinach dip. Patient has been hesitant about " "eating in case it made his blood sugar rise. He notes having steroid course 8 years ago through different neurologist and was prescribed insulin right away. He expected to be prescribed insulin this time, but didn't get this despite informing Dr. oRdríguez. He has taken metformin in the past but has difficulty swallowing this because of the lesion affecting his face and throat. No other new symptoms or complaints at this time. See ROS for further details.      Patient will have second infusion of solumedrol tomorrow at [facility]. He has 4 more doses of solumedrol and is concerned that \"this is only day one; by day 3 where is it going to be?\"    PCP: Dr Sukumar España - [phone] (care line)  Patient follows with cardiologist at [facility].     I have reviewed the Medications, Allergies, Past Medical and Surgical History, and Social History in the [facility] system.  PAST MEDICAL HISTORY:   Past Medical History:   Diagnosis Date     Back pain      Coarctation of aorta      CTS (carpal tunnel syndrome)      Dyslipidemia      Falls     past hx of falls     MS (multiple sclerosis) (H)      Other chronic pain      Pain 8/15/2015     Pneumonia     \"yearly\" per pt\"     Polyuria      Preiser disease      Right bundle branch block      Vitamin D deficiency        PAST SURGICAL HISTORY:   Past Surgical History:   Procedure Laterality Date     bilateral carapl tunnel       CHOLECYSTECTOMY  2010     ENT SURGERY  1963     GALLBLADDER SURGERY       INSERT INTRATHECAL PAIN PUMP       REPLACE INTRATHECAL PAIN PUMP N/A 8/14/2015    Procedure: REPLACE INTRATHECAL PAIN PUMP;  Surgeon: Sukumar Salinas MD;  Location: [facility] OR     WRIST SURGERY         FAMILY HISTORY:   Family History   Problem Relation Age of Onset     Neurologic Disorder Other      Paternal uncle-multiple sclerosis     CANCER Mother      CANCER Father        SOCIAL HISTORY:   Social History   Substance Use Topics     Smoking status: Current Every Day Smoker     Packs/day: 0.50 " "    Types: Cigarettes     Smokeless tobacco: Never Used     Alcohol use No       Patient's Medications   New Prescriptions    No medications on file   Previous Medications    IBUPROFEN PO    Take 600 mg by mouth every 6 hours as needed for moderate pain    MEDICATION GIVEN BY INTRATHECAL PUMP    NO DRUG NEEDED.    PREDNISONE (DELTASONE) 50 MG TABLET    Take 25 tablets (1,250 mg) by mouth daily for 5 days   Modified Medications    No medications on file   Discontinued Medications    No medications on file          Allergies   Allergen Reactions     Naprosyn [Naproxen] Swelling     Nuts Swelling     Tree nuts      Review of Systems   Constitutional: Positive for appetite change (poor appetite) and diaphoresis. Negative for fever.   HENT: Negative for congestion.    Eyes: Negative for redness.   Respiratory: Negative for shortness of breath.    Cardiovascular: Negative for chest pain.   Gastrointestinal: Negative for abdominal pain.   Endocrine: Positive for polydipsia.        Increased thirst   Genitourinary: Negative for difficulty urinating, frequency and hematuria.   Musculoskeletal: Negative for arthralgias and neck stiffness.   Skin: Negative for color change.   Neurological: Negative for headaches.        Known MS, currently in flare   Psychiatric/Behavioral: Negative for confusion.   All other systems reviewed and are negative.      Physical Exam   BP: (!) 143/106  Pulse: 107  Temp: 97.6  F (36.4  C)  Resp: 20  Height: 185.4 cm (6' 1\")  Weight: 118.4 kg (261 lb)  SpO2: 98 %  Physical Exam  CONSTITUTIONAL: Well-developed and well-nourished. Awake and alert. Non-toxic appearance. No acute distress.   HENT:   - Head: Normocephalic and atraumatic.   - Ears: Hearing and external ear grossly normal.   - Nose: Nose normal. No rhinorrhea. No epistaxis.   - Mouth/Throat: Oropharynx is clear and MMM  EYES: Conjunctivae and lids are normal. No scleral icterus.   NECK: Normal range of motion and phonation normal. Neck " supple.  No tracheal deviation, no stridor. No edema or erythema noted.  CARDIOVASCULAR: Normal rate, regular rhythm, may have very quiet murmur at the sternal border (very faint)  PULMONARY/CHEST: Effort normal. No accessory muscle usage or stridor. No respiratory distress.  No appreciable abnormal breath sounds.  ABDOMEN: Soft, non-distended. No tenderness. No rigidity, rebound or guarding.   MUSCULOSKELETAL: Extremities warm and seemingly well perfused. No edema or calf tenderness.  NEUROLOGIC: Awake, alert. Not disoriented. No seizure activity. GCS 15  SKIN: Skin is warm and dry. No rash noted. No diaphoresis. No pallor.   PSYCHIATRIC: Normal mood and affect. Speech and behavior normal. Thought processes linear. Cognition and memory are normal.     ED Course     ED Course   Value Comment By Time   Glucose: (!) 387 Uptrending throughout day. Marcelle Zhou MD 03/26 0030     Procedures        Elevated lactate thought to be secondary to dehydration and hyperglycemia. No clear evidence for sepsis or infection.     Labs Ordered and Resulted from Time of ED Arrival Up to the Time of Departure from the ED   GLUCOSE BY METER - Abnormal; Notable for the following:        Result Value    Glucose 387 (*)     All other components within normal limits       Assessments & Plan (with Medical Decision Making)   IMPRESSION: 56-year-old male with known MS who recently started today on an IV steroid plan for such, presents for uptrending blood sugars throughout the day and increased thirst over the last few weeks, as described further above in the HPI/ROS, Outside of the increased thirst, the abnormal blood sugars, and symptoms related to his MS which were already known and being managed with the steroids he has no immediate complaints or concerns. Clinically, patient appears well.  He had slight elevation of HR on arrival, but vitals otherwise grossly WNL. Known cardiac murmur and MS findings. No acute findings/changes.       With regards to the patient's hyperglycemia this certainly could be exacerbated by the increase in steroid use here today, however other things to consider would be another potential triggers such as infection, etc.  Also, when thinking about the patient's ongoing thirst this could also be related to ongoing hyperglycemia perhaps his oral regimen with metformin is not enough to control his symptoms any longer and he is having more chronic hyperglycemia but does have noticed that more  with the steroids.also with increased thirst could also think again about other potential causes such as other metabolic/endocrine abnormalities occult infections, etc.    PLAN: Laboratory studies to see if more serious findings. Disposition will be determinant on objective findings and ability to make safe plan.     RESULTS:  See ED Course section above for particular pertinent findings and comments  - Labs: lactate elevated, glucose elevated, normal anion gap, pH 7.34  --- Considering elevated lactate   - Urine: No UTI    INTERVENTIONS:   - IVF  - Insulin drip not started initially because no anion gap, will allow admitting team to start on SSI and make more definitive plan.     RE-EVALUATION:  See ED Course section above for particular pertinent findings and comments  - The patient continues to look well.     DISCUSSIONS:  - In thinking about the patient's elevated lactate we considered dehydration, occult infection medication effect (uses metformin for sugar management at home per patient)  - w/ Patient and wife: I discussed the findings with the patient and his wife.  He is agreeable to come in for admission/observationor further evaluation and management of these acute findings as well as to develop a better plan for management of his sugars while he is on this steroid treatment.  - w/ IM attending:  I discussed the care of the patient with the admitting team.     DISPOSITION/PLANNING:  - FINAL IMPRESSION: hyperglycemia  (without anion gap), elevated lactic acid w/o significant change in general pH on VBG  - DISPOSITION: Admit to IM for further evaluation/management  --- Pending: Blood cultures  --- Recommendations: management of current findings, develop plan for sugars while on steroids, etc.        ______________________________________________________________________________    - I have reviewed the available nursing notes.            New Prescriptions    No medications on file       Final diagnoses:   None   I, Paradise Mcdonald, am serving as a trained medical scribe to document services personally performed by Marcelle Zhou MD, based on the provider's statements to me.  This document has been checked and approved by Dr. Winnie QIU, Marcelle Zhou MD, was physically present and have reviewed and verified the accuracy of this note documented by Paradise Mcdonald, medical scribe.       3/25/2017   [facility], [place], EMERGENCY DEPARTMENT     Marcelle Zhou MD  03/27/17 5801

## 2017-03-26 NOTE — PROGRESS NOTES
OBSERVATION GOALS  -diagnostic tests and consults completed and resulted: PENDING  -vital signs normal or at patient baseline: HR 84, /83, RR 16,  SPO2 98% (RA)  -tolerating oral intake to maintain hydration:  ORAL INTAKE ENCOURAGED. 120 ML INTAKE.  -BG less than 180 on two consecutive checks:

## 2017-03-26 NOTE — H&P
[facility], [place]    History and Physical  Hospitalist       Date of Admission:  3/25/2017  Date of Service (when I saw the patient) 3/25/2017  Assessment & Plan   Jeff Lewis is a 56 year old male who presents with High Blood Glucose levels.     Assessment and plan:     Jeff Lewis is a 56 year old male who presents with  Multiple Sclerosis, bicuspid aortic valve, chronic pain related to MS and obestiy, DM.  He had MS for 11 years. FU with Dr Federico Rodríguez from Neurology service. He is  Diagnosed having MS flare up by Neurology and was started on steroid infusion as out with infusion center. After First dose of steroid (suspect 1mg of solumedrol) his BG went up very high (330). Hence he came to ER for eval.     1. Hyperglycemia: Hold metformin. Start lantus 7 units BID. Schedule 3 Units novlog with meal and add SSI. Check HbA1C. Adjust insulin based on BG. Will likely needs insulin while on steroids.  2. Multiple Sclerosis exacerbation: Continue IV solumedrol 1 gm daily while here.   3. Lactic Acidosis: Likely due to dehydration and use of metformin. Coming down with IVF. Recheck LA in am. Continue NS at 150 ml per hr.   4. Chronic Pain related to MS: Has intrathecal pump in place. Add prn oxycodone while here. FU with Pain clinic.     DVT Prophylaxis: Heparin SQ  Code Status: Full Code    Disposition: Expected discharge in 1 day once BG are well controlled and LA improved.     Rubio Fall MD    Primary Care Physician   Sukumar España    Chief Complaint     Hyperglycemia    History is obtained from the patient    History of Present Illness      Jeff Lewis is a 56 year old male who presents with  Multiple Sclerosis, bicuspid aortic valve, chronic pain related to MS and obestiy, DM.  He had MS for 11 years. FU with Dr Federico Rodríguez from Neurology service. Two weeks ago, he started having jaw numbness on the left side with some numbness of  "the left ear and eye area too. He C/O HAs as well. He had MRI which showed MS flare up. 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     He was started on IV steroids for MS flare up by Neurology service. The first infusion he got was yesterday. He recently was started on metformin for type 2 diabetes three months ago. He checked his BG after getting the steroids, it was more than 330 at home. Hence he came to the ER for eval. In the ER he was 387. He Co Being thirsty.     He has chronic pain in the neck, back. He sees pain clinic. Has Implanted intrathecal pump.       Past Medical History    I have reviewed this patient's medical history and updated it with pertinent information if needed.   Past Medical History:   Diagnosis Date     Back pain      Coarctation of aorta      CTS (carpal tunnel syndrome)      Dyslipidemia      Falls     past hx of falls     MS (multiple sclerosis) (H)      Other chronic pain      Pain 8/15/2015     Pneumonia     \"yearly\" per pt\"     Polyuria      Preiser disease      Right bundle branch block      Vitamin D deficiency        Past Surgical History   I have reviewed this patient's surgical history and updated it with pertinent information if needed.  Past Surgical History:   Procedure Laterality Date     bilateral carapl tunnel       CHOLECYSTECTOMY  2010     ENT SURGERY  1963     GALLBLADDER SURGERY       INSERT INTRATHECAL PAIN PUMP       REPLACE INTRATHECAL PAIN PUMP N/A 8/14/2015    Procedure: REPLACE INTRATHECAL PAIN PUMP;  Surgeon: Sukumar Salinas MD;  Location: [facility] OR     WRIST SURGERY         Prior to Admission Medications   Prior to Admission Medications   Prescriptions Last Dose Informant Patient Reported? Taking?   IBUPROFEN PO 3/26/2017 at Unknown time Self Yes Yes   Sig: Take 600 mg by mouth every 6 hours as needed for moderate pain   METFORMIN HCL PO   Yes Yes   Sig: Take by mouth daily (with breakfast)   Medication given by intrathecal pump " Unknown at Unknown time Self No No   Sig: NO DRUG NEEDED.   Patient taking differently: NO DRUG NEEDED.      Facility-Administered Medications: None     Allergies   Allergies   Allergen Reactions     Naprosyn [Naproxen] Swelling     Nuts Swelling     Tree nuts       Social History   I have reviewed this patient's social history and updated it with pertinent information if needed. Jeff Lewis  reports that he has been smoking Cigarettes.  He has been smoking about 0.50 packs per day. He has never used smokeless tobacco. He reports that he does not drink alcohol or use illicit drugs.    Family History   I have reviewed this patient's family history and updated it with pertinent information if needed.   Family History   Problem Relation Age of Onset     Neurologic Disorder Other      Paternal uncle-multiple sclerosis     CANCER Mother      CANCER Father        Review of Systems      The 10 point Review of Systems is negative other than noted in the HPI or here.    Physical Exam   Temp: 97.6  F (36.4  C) Temp src: Oral BP: 136/83 Pulse: 83 Heart Rate: 84 Resp: 16 SpO2: 98 % O2 Device: None (Room air)    Vital Signs with Ranges  Temp:  [96.1  F (35.6  C)-97.6  F (36.4  C)] 97.6  F (36.4  C)  Pulse:  [] 83  Heart Rate:  [84] 84  Resp:  [16-20] 16  BP: (122-146)/() 136/83  SpO2:  [94 %-99 %] 98 %  267 lbs 0 oz    Constitutional:  Awake, alert, cooperative, no apparent distress.  Eyes: Conjunctiva and pupils examined and normal.  HEENT: Moist mucous membranes, normal dentition.  Respiratory: Clear to auscultation bilaterally, no crackles or wheezing.  Cardiovascular: Regular rate and rhythm, normal S1 and S2, and no murmur noted.  GI: Soft, non-distended, non-tender, normal bowel sounds.  Lymph/Hematologic: No anterior cervical or supraclavicular adenopathy.  Skin: No rashes, no cyanosis, no edema.  Musculoskeletal: No joint swelling, erythema or tenderness.  Neurologic: Detailed exam not done. Has  chronic neck pain. Moves upper and lower extremities  Psychiatric: Alert, oriented to person, place and time, no obvious anxiety or depression.      Data   Data reviewed today:  I personally reviewed no images or EKG's today.    Recent Labs  Lab 03/26/17  0019   WBC 10.8   HGB 16.1   MCV 91      INR 1.12      POTASSIUM 4.1   CHLORIDE 104   CO2 22   BUN 20   CR 0.85   ANIONGAP 11   MICHELET 9.2   *   ALBUMIN 3.6   PROTTOTAL 7.8   BILITOTAL 0.5   ALKPHOS 136   ALT 21   AST 13       No results found for this or any previous visit (from the past 24 hour(s)).

## 2017-03-26 NOTE — ED NOTES
"Triage Assessment & Note:    BP (!) 143/106  Pulse 107  Temp 97.6  F (36.4  C) (Oral)  Resp 20  Ht 1.854 m (6' 1\")  Wt 118.4 kg (261 lb)  SpO2 98%  BMI 34.43 kg/m2    Patient presents with: c/o high blood sugars. PT recently started on steroids for his MS. PT reports he is a borderline diabetic. PT reports his last blood sugar was 361 at 2200.     Home Treatments/Remedies: none    Febrile / Afebrile? afebrial    Duration of C/o:  Today    Rina Villareal  March 25, 2017      "

## 2017-03-26 NOTE — PLAN OF CARE
Problem: Discharge Planning  Goal: Discharge Planning (Adult, OB, Behavioral, Peds)      OBSERVATION GOALS     Diagnostic tests and consults completed and resulted:         PENDING    Vital signs normal or at patient baseline:     Yes                                Tolerating oral intake to maintain hydration:   Yes    BG less than 180 on two consecutive checks:   No  Glucose 280

## 2017-03-26 NOTE — PROGRESS NOTES
[facility] - Internal Medicine Daily Note   Date of Service: 3/26/2017  Patient: Jeff Lewis  MRN: [id number]  Admission Date: 3/25/2017  Hospital Day # 0    Assessment & Plan: Jeff Lewis is a 56 year old male with history of MS, DMII, and bicuspid aortic valve who was admitted after becoming hyperglycemic following steroid infusion for MS flare.    Hyperglycemia: Presented with glucose 308. UA with >1000 glucose, 10 ketones. Serum ketones not checked. AG normal. PTA on metformin, no insulin. No A1c on file. Started on 7 units lantus bid, 3 units novolog with meals and MSSI. Likely will be able to stop insulin after steroids completed. Glucose improved to low 200s 3/26.   - Check hemoglobin A1c  - Diabetes educator, nutrition consult  - Continue lantus, novolog, MSSI as above  - Hypoglycemia protocol   - Will need close follow up with PCP for stopped insulin after completing steroids as below     MS, current exacerbation: Last seen OP 3/21/2017 by Dr. Rodríguez. MRI 3/23 with enhancing lesion on left brainstem, c/w symptom of left face numbness. Diagnosed with acute flare. Per notes, patient to take 1 gm IV methylprednisolone x5 days with the [facility].   - Continue Methylprednisolone for 5 day course   - Staff message sent to Dr. Rodríguez fyi patient admitted    Lactic acidosis: Lactic acid elevated to 4.0 on admission. Improved to 3.0 with IVF. VSS. No respiratory symptoms. UA negative for infection. BCx NGTD. Likely elevated due to metformin and dehydration.   - Repeat lactate now  - Gentle 500 cc bolus, then recheck    Chronic pain: Follows with pain team OP, has pain contract. PTA has intrathecal pump. Per patient, per pain contract, cannot get any opiates inpatient or this would void contract. Pain is stable.  - D/c oxycodone   - Continue Tylenol     Bicuspid aortic valve: Per patient, diagnosed at age 16. Follows with regions every 5 years. Last seen 3 years ago. No echo in Care  "Everywhere  - Monitor, follow up with regions as indicated.    Hypophosphatemia: Phos low at 2.4  - Replacement protocol       Consulting Services: DM educator, nutritoin    CODE: Full   DVT: SQ Heparin  Diet/fluids: IVF as above, moderate carb diet  Disposition: Possibly 3/27 pending glucose levels     Case discussed with attending physician, / Ron Stoner  Internal Medicine TIFF[facility] Hospitalist   [facility]   Pager: [phone]    Team: Medicine [facility] 2  Page Cross Cover after 5 pm: pager [phone]   ___________________________________________________________________    Subjective & Interval Hx:  Reports glucose spiked quickly after getting steroids. Frustrated as he predicted this would happen. Currently asymptomatic in terms of hyperglycemia. Reports difficulty sleeping with mild restlessness which he thinks may be 2/2 steroids. No dyspnea or chest pain. Denies confusion or AMS. No respiratory symptoms. Denies fever or chills.      Last 24 hr care team notes reviewed.   ROS:  4 point ROS including Respiratory, CV, GI and , other than that noted in the HPI, is negative.    Medications: Reviewed in EPIC. List below for reference    Physical Exam:    /73 (BP Location: Right arm)  Pulse 83  Temp 97.5  F (36.4  C) (Oral)  Resp 16  Ht 1.854 m (6' 1\")  Wt 121.1 kg (267 lb)  SpO2 97%  BMI 35.23 kg/m2     GENERAL: Alert and oriented x 3. NAD. Plesaant  HEENT: Anicteric sclera. Mucous membranes moist.   CV: RRR. S1, S2. No murmurs appreciated.   RESPIRATORY: Effort normal on room air. Lungs CTAB with no wheezing, rales, rhonchi.   GI: Abdomen quite large. Soft and non distended with normoactive bowel sounds present in all quadrants. No tenderness, rebound, guarding.   NEUROLOGICAL: No focal deficits. Moves all extremities.    EXTREMITIES: No peripheral edema. Intact bilateral pedal pulses.   SKIN: No jaundice. No rashes.     Labs & Studies of Note: I personally reviewed " pertinent labs and studies.

## 2017-03-27 VITALS
RESPIRATION RATE: 18 BRPM | DIASTOLIC BLOOD PRESSURE: 61 MMHG | BODY MASS INDEX: 36.04 KG/M2 | HEIGHT: 73 IN | WEIGHT: 271.9 LBS | SYSTOLIC BLOOD PRESSURE: 122 MMHG | OXYGEN SATURATION: 95 % | HEART RATE: 83 BPM | TEMPERATURE: 97.6 F

## 2017-03-27 LAB
HBA1C MFR BLD: 8.4 % (ref 4.3–6)
LACTATE BLD-SCNC: 2 MMOL/L (ref 0.7–2.1)

## 2017-03-27 PROCEDURE — 99207 ZZC CDG-CODE CATEGORY CHANGED: CPT | Performed by: PHYSICIAN ASSISTANT

## 2017-03-27 PROCEDURE — 96372 THER/PROPH/DIAG INJ SC/IM: CPT

## 2017-03-27 PROCEDURE — 83605 ASSAY OF LACTIC ACID: CPT | Performed by: HOSPITALIST

## 2017-03-27 PROCEDURE — 00000146 ZZHCL STATISTIC GLUCOSE BY METER IP

## 2017-03-27 PROCEDURE — 96361 HYDRATE IV INFUSION ADD-ON: CPT

## 2017-03-27 PROCEDURE — 99217 ZZC OBSERVATION CARE DISCHARGE: CPT | Performed by: PHYSICIAN ASSISTANT

## 2017-03-27 PROCEDURE — 25000132 ZZH RX MED GY IP 250 OP 250 PS 637: Performed by: HOSPITALIST

## 2017-03-27 PROCEDURE — 83036 HEMOGLOBIN GLYCOSYLATED A1C: CPT | Performed by: HOSPITALIST

## 2017-03-27 PROCEDURE — G0378 HOSPITAL OBSERVATION PER HR: HCPCS

## 2017-03-27 PROCEDURE — 36415 COLL VENOUS BLD VENIPUNCTURE: CPT | Performed by: HOSPITALIST

## 2017-03-27 PROCEDURE — 25000128 H RX IP 250 OP 636: Performed by: PHYSICIAN ASSISTANT

## 2017-03-27 PROCEDURE — 25000128 H RX IP 250 OP 636: Performed by: HOSPITALIST

## 2017-03-27 RX ORDER — FAMOTIDINE 10 MG
10 TABLET ORAL 2 TIMES DAILY
Status: ON HOLD | COMMUNITY
Start: 2017-03-27 | End: 2017-10-05

## 2017-03-27 RX ADMIN — HEPARIN SODIUM 5000 UNITS: 5000 INJECTION, SOLUTION INTRAVENOUS; SUBCUTANEOUS at 10:35

## 2017-03-27 RX ADMIN — INSULIN ASPART 3 UNITS: 100 INJECTION, SOLUTION INTRAVENOUS; SUBCUTANEOUS at 12:06

## 2017-03-27 RX ADMIN — SODIUM CHLORIDE: 900 INJECTION, SOLUTION INTRAVENOUS at 00:46

## 2017-03-27 RX ADMIN — INSULIN ASPART 4 UNITS: 100 INJECTION, SOLUTION INTRAVENOUS; SUBCUTANEOUS at 08:27

## 2017-03-27 RX ADMIN — SODIUM CHLORIDE 1000 MG: 9 INJECTION, SOLUTION INTRAVENOUS at 08:26

## 2017-03-27 ASSESSMENT — ENCOUNTER SYMPTOMS
ENDOCRINE COMMENTS: INCREASED THIRST
HEMATURIA: 0
FREQUENCY: 0

## 2017-03-27 ASSESSMENT — PAIN DESCRIPTION - DESCRIPTORS: DESCRIPTORS: CONSTANT;SHARP

## 2017-03-27 NOTE — PLAN OF CARE
Problem: Discharge Planning  Goal: Discharge Planning (Adult, OB, Behavioral, Peds)  Outcome: No Change  OBSERVATION GOALS  -diagnostic tests and consults completed and resulted: PENDING  -vital signs normal or at patient baseline: .  -tolerating oral intake to maintain hydration: GOOD ORAL INTAKE, NO NAUSEA  -BG less than 180 on two consecutive checks:

## 2017-03-27 NOTE — PROGRESS NOTES
Care Coordinator- Discharge Planning     Admission Date/Time:  3/25/2017  Attending MD:  Jacky Velasquez MD     Data  Date of initial CC assessment:  3/27/17  Chart reviewed, discussed with interdisciplinary team.   Patient was admitted for:   1. Elevated lactic acid level    2. Hyperglycemia         Assessment    Spoke with patient in his room.  Patient given MOON form.  Notified C[facility] that patient will discharge today and will have his infusions as scheduled starting with 3/28/17 appointment.  No other needs.    Coordination of Care and Referrals: Appointments confirmed for Outpatient Infusion Services as scheduled.      Plan  Anticipated Discharge Date:  3/28/17  Anticipated Discharge Plan:  Discharge with OP infusion    CTS Handoff completed:  YES    Eunice Eubanks RN, BSN  Care Coordinator [facility] [address]  Pager: [phone]  Phone: [phone]

## 2017-03-27 NOTE — PLAN OF CARE
"Problem: Discharge Planning  Goal: Discharge Planning (Adult, OB, Behavioral, Peds)  Outcome: No Change  OBSERVATION GOALS  -diagnostic tests and consults completed and resulted: PENDING  -vital signs normal or at patient baseline: /66 (BP Location: Left arm)  Pulse 83  Temp 97.7  F (36.5  C) (Oral)  Resp 18  Ht 1.854 m (6' 1\")  Wt 123.3 kg (271 lb 14.4 oz)  SpO2 96%  BMI 35.87 kg/m2  -tolerating oral intake to maintain hydration: GOOD ORAL INTAKE, NO NAUSEA  -BG less than 180 on two consecutive checks: LAST BG CHECK 295.      "

## 2017-03-27 NOTE — DISCHARGE SUMMARY
[facility]  Discharge Summary    Jeff Lewis MRN# [id number]   YOB: 1960 Age: 56 year old     Date of Admission:  3/25/2017  Date of Discharge:  3/27/2017  Admitting Physician:  Rubio Fall MD  Discharge Physician:  Jacky Velasquez MD (Contact: Karolina Amador PA-C)  Discharging Service:  Internal Medicine     Primary Provider: Sukumar España          Brief History of Illness:   Jeff Lewis is a 56 year old male with history of MS, DMII, and bicuspid aortic valve who was admitted after becoming hyperglycemic following steroid infusion for MS flare.         Primary care provider to do:   - Follow up on BGs management  - Patients metformin was held on discharge d/t lactic acidosis, considering resuming if stable on follow up          Discharge Diagnosis:   Hyperglycemia - steroid induced  MS  Lactic acidosis - resolved  Chronic pain   Bicuspid aortic valve  Hyperphosphatemia             Discharge Disposition:   Discharged to home           Condition on Discharge:   Discharge condition: Stable   Code status on discharge: Full Code           Procedures:   None this admission          Discharge Medications:     Current Discharge Medication List      START taking these medications    Details   famotidine (PEPCID) 10 MG tablet Take 1 tablet (10 mg) by mouth 2 times daily    Associated Diagnoses: GI problem      insulin glargine (LANTUS) 100 UNIT/ML injection Inject 12 Units Subcutaneous 2 times daily for 2 days  Qty: 3 mL, Refills: 0    Associated Diagnoses: Hyperglycemia      !! insulin lispro (HUMALOG KWIKPEN) 100 UNIT/ML injection Inject 8 Units Subcutaneous 3 times daily (before meals)  Qty: 3 mL, Refills: 0    Associated Diagnoses: Hyperglycemia      methylPREDNISolone sodium succinate 1,000 mg Inject 1,000 mg into the vein every 24 hours      !! insulin lispro (HUMALOG KWIKPEN) 100 UNIT/ML injection     Comments: Do Not give Correction Insulin if  Pre-Meal BG less than 140.   For Pre-Meal  - 189 give 1 unit. - 239 give 2 units. - 289 give 3 units.  - 339 give 4 units. - 399 give 5 units. -449 give 6 units. Pre-Meal BG greater than or equal to 450 give 7 units.       !! - Potential duplicate medications found. Please discuss with provider.      CONTINUE these medications which have NOT CHANGED    Details   IBUPROFEN PO Take 600 mg by mouth every 6 hours as needed for moderate pain      Medication given by intrathecal pump NO DRUG NEEDED.  Qty: 1 each, Refills: 0    Associated Diagnoses: Cervicalgia         STOP taking these medications       metFORMIN (GLUCOPHAGE-XR) 500 MG 24 hr tablet Comments:   Reason for Stopping:                     Consultations:   Consultation during this admission received from diabetic nurse educator              Hospital Course:   Hyperglycemia, steroid induced. Admitted with glucose 308. UA with >1000 glucose, 10 ketones. Serum ketones not checked. AG normal. PTA on metformin, no insulin. No A1c on file. Started on 7 units lantus bid, 3 units novolog with meals and MSSI but with perisistent hyperglycemia. Thus lantus and novolog increased to 12units BID with 8U TID with meals on 3/27. Patient tolerated this dose without any episodes of hypoglycemia and with BGs better controlled in the 200s.  - Continue lantus 12U BID and novolog 8U TID with meals and novolog medium SSI on discharge to be taken only on days when he received high dose steroids  - Home metformin held on discharge due to lactic acidosis  - Will follow up with PCP later this week to determine ongoing treatment plan      MS, current exacerbation: Last seen OP 3/21/2017 by Dr. Rodríguez. MRI 3/23 with enhancing lesion on left brainstem, c/w symptom of left face numbness. Diagnosed with acute flare. Per notes, patient to take 1 gm IV methylprednisolone x5 days with the [facility] (through 3/29).   - Continue Methylprednisolone for 5 day  "course (through 3/29)  - Patient has follow up appt with Dr Rodríguez this week     Lactic acidosis. Likely 2/2 metformin and dehydration. Resolved with IVF. Metformin held on discharge, follow up with PCP to determine to resume or not.     Chronic pain. Follows with pain team OP, has pain contract. PTA has intrathecal pump. Per patient, per pain contract, cannot get any opiates inpatient or this would void contract. Pain is stable. No oxycodone was given while inpatient, continue tylenol and PTA meds on discharge.      Bicuspid aortic valve. Per patient, diagnosed at age 16. Follows with regions every 5 years. Last seen 3 years ago. No echo in Care Everywhere. Monitor, follow up with regions as indicated.               Final Day of Progress before Discharge:       Physical Exam:  Blood pressure 122/61, pulse 83, temperature 97.6  F (36.4  C), temperature source Oral, resp. rate 18, height 1.854 m (6' 1\"), weight 123.3 kg (271 lb 14.4 oz), SpO2 95 %.  GENERAL: Alert and oriented x 3. NAD. Pleasant and interactive.   HEENT: Anicteric sclera. PERRL. MMM. Numbness on L side of face appreciated.   CV: RRR. S1, S2. No m/r/g.   RESPIRATORY: Effort normal on RA. Lungs CTAB.  GI: Abdomen soft and non distended with normoactive bowel sounds present in all quadrants. No tenderness, rebound, guarding.   MUSCULOSKELETAL: No joint swelling or tenderness. Moves all extremities.   NEUROLOGICAL: No focal deficits. Strength 5/5 bilaterally in upper and lower extremities.   EXTREMITIES: No peripheral edema. Intact bilateral pedal pulses.   SKIN: No jaundice. No rashes.        Data:  All laboratory data reviewed             Significant Results:     Lab Results   Component Value Date    WBC 10.8 03/26/2017    HGB 16.1 03/26/2017    HCT 45.5 03/26/2017     03/26/2017     03/26/2017    POTASSIUM 4.1 03/26/2017    CHLORIDE 104 03/26/2017    CO2 22 03/26/2017    BUN 20 03/26/2017    CR 0.85 03/26/2017     (H) " 03/26/2017    SED 9 09/29/2016    AST 13 03/26/2017    ALT 21 03/26/2017    ALKPHOS 136 03/26/2017    BILITOTAL 0.5 03/26/2017    INR 1.12 03/26/2017      No results found for this or any previous visit (from the past 48 hour(s)).             Pending Results:   Unresulted Labs Ordered in the Past 30 Days of this Admission     Date and Time Order Name Status Description    3/26/2017 0133 Blood culture Preliminary     3/26/2017 0133 Blood culture Preliminary                   Discharge Instructions and Follow-Up:     Discharge Procedure Orders  Reason for your hospital stay   Order Comments: You were admitted for hyperglycemia as a result of being recently started on high dose steroids. You were started on lantus twice daily and scheduled short acting insulin with meals. You were discharged home 3/27.     Adult [facility] Follow-up and recommended labs and tests   Order Comments: Follow up with primary care provider on 3/30 or 3/31  Follow up with your MS specialist as you have had scheduled    Appointments on [place] and/or [facility] (with [facility] or [facility] provider or service). Call [phone] if you haven't heard regarding these appointments within 7 days of discharge.     Activity   Order Comments: Your activity upon discharge: activity as tolerated   Order Specific Question Answer Comments   Is discharge order? Yes      Monitor and record   Order Comments: Monitor your blood sugar 4 times daily  before meals and at bedtime)     When to contact your care team   Order Comments: Please contact your primary care provider or seek medical care if you develop chest pain, shortness of breath, fever >101F, chills, abdominal pain, blood sugar persistently >350, or if any other concerns arise.     Discharge Instructions   Order Comments: Please take your insulin as prescribed, only take lantus and novolog on the days you are getting the high dose steroid. Do not take the insulin on the days you are not getting steroids,  as this can cause your blood sugar to drop too low.  Please hold off on restarting your metformin until you follow up with your primary care provider     Full Code     Diet   Order Comments: Follow this diet upon discharge: regular diet   Order Specific Question Answer Comments   Is discharge order? Yes               Karolina ALLEN Hospitalist  [phone]  March 27, 2017        Time spent on patient: 50 minutes total including face to face and coordinating care time reviewing current illness, any medication changes, and the care plan for today.    Discharge plan was discussed with Dr Velasuqez, patient, and bedside RN.

## 2017-03-27 NOTE — PLAN OF CARE
Problem: Individualization  Goal: Patient Preferences  Outcome: No Change  Pt Telem SR 70-80's. Vitals stable. IV continues at 100. 500 cc bolus given at 1700. Glucose 304. Covered with ss insulin per order. C/O Right facial pain from MS. Internal Intrathecal pump for  MS treatment.  Voiding well. Good PO. RN to replace low Phosphorus level.  Pt up ad gris. Family here this shift. Will continue to monitor blood glucose levels.

## 2017-03-27 NOTE — PLAN OF CARE
Problem: Discharge Planning  Goal: Discharge Planning (Adult, OB, Behavioral, Peds)  /27/17 3924     Cally Travis-Registered Nurse (Nursing)  Patient and wife seen in [facility] for general diabetes information and insulin administration and S&S of hyper/hypoglycemia. Pt. RD correctly on model with use of insulin pens. Received written material related to all content taught. Asked many relevant questions. Answered all teach-back questions appropriately. States he understands all information presented.

## 2017-03-27 NOTE — PLAN OF CARE
Problem: Goal Outcome Summary  Goal: Goal Outcome Summary  Outcome: No Change  Pt A&O X4, VSS, afebrile, HR sinus jose with R BBB in the 50's, BP's 120-130's/60's, O2 sats > 90% on RA. LS clear/diminished, BS+ X4, CMS intact. Pt slept part of overnight shift, had few requests/complaints. Reported pain in L arm IV during KPhos infusion (see prior note), received new PIV in R arm, patent & infusing NS @ 100mL/hr. C/O of chronic R neck pain, declined interventions. Tolerating moderate CHO diet with no nausea/emesis. BG's: 7137=515, 4632=656, 6596=799, 1108=175. Voiding urine adequately via urinal, passing gas, no BM. Gets up ad gris, ambulates independently. Able to make needs known via call light, will continue to monitor per plan of care.

## 2017-03-27 NOTE — PLAN OF CARE
Problem: Goal Outcome Summary  Goal: Goal Outcome Summary  Outcome: Completed Date Met:  03/27/17  DISCHARGE                         No discharge date for patient encounter.  ----------------------------------------------------------------------------  Discharged to: Home  Via: private transportation  Accompanied by: Family  Discharge Instructions: Regular diet, activity as tolerated, medications, follow up appointments, when to call the MD, aftercare instructions.  Prescriptions: To be filled by discharge pharmacy; medication list reviewed & sent with pt  Follow Up Appointments: arranged; information given  Belongings: All sent with pt  IV: d/c'd  Telemetry: d/c'd  Pt exhibits understanding of above discharge instructions; all questions answered. Pt received diabetes education and sliding scale insulin ordered/reveiwed with patient. Pt demonstrates and verbalizes understanding. Discharging at this time.      Discharge Paperwork: Signed, copied, and sent home with patient.

## 2017-03-27 NOTE — PLAN OF CARE
Pt A/O X4. Appetite good. Solumedrol infusing. Per MD recheck BG at 1pm to monitor lantus. If BG stable may dc after 1pm. Diabetes education scheduled tentatively for 12:30pm. Will monitor.

## 2017-03-27 NOTE — PROVIDER NOTIFICATION
Becki Mathew with update: pt's Lactic came back at 2.7, down from 3.0 this afternoon. No new orders at this time, will continue to monitor per plan of care.

## 2017-03-27 NOTE — PLAN OF CARE
. Goal to keep BG <300. Pt going to diabetes education at this time. Plan to DC after education and final BG check.

## 2017-03-27 NOTE — PLAN OF CARE
"Problem: Discharge Planning  Goal: Discharge Planning (Adult, OB, Behavioral, Peds)  Outcome: No Change  OBSERVATION GOALS  -diagnostic tests and consults completed and resulted: PENDING  -vital signs normal or at patient baseline: /66 (BP Location: Left arm)  Pulse 83  Temp 97.5  F (36.4  C) (Oral)  Resp 16  Ht 1.854 m (6' 1\")  Wt 121.1 kg (267 lb)  SpO2 97%  BMI 35.23 kg/m2  -tolerating oral intake to maintain hydration: GOOD ORAL INTAKE, NO NAUSEA  -BG less than 180 on two consecutive checks:       "

## 2017-03-27 NOTE — PROVIDER NOTIFICATION
Becki Mathew with update: this RN attempted to replace pt's Phos of 2.4 with 15mmol of KPhos. After infusion started pt reported intense burning at IV site, this RN flushed IV, lowered infusion rate, and restarted infusion. Pt continued to report burning pain at IV site. This RN had vascular access place new PIV in R arm and restarted KPhos infusion - pt reported immediate burning pain again despite fresh PIV site with good flush/blood return noted. This RN stopped KPhos infusion and restarted LR @ 100. Will recheck labs in AM and continue to monitor.

## 2017-03-27 NOTE — PLAN OF CARE
"Problem: Discharge Planning  Goal: Discharge Planning (Adult, OB, Behavioral, Peds)  Outcome: No Change  OBSERVATION GOALS  -diagnostic tests and consults completed and resulted: PENDING  -vital signs normal or at patient baseline: /66 (BP Location: Left arm)  Pulse 83  Temp 97.7  F (36.5  C) (Oral)  Resp 18  Ht 1.854 m (6' 1\")  Wt 123.3 kg (271 lb 14.4 oz)  SpO2 96%  BMI 35.87 kg/m2  -tolerating oral intake to maintain hydration: GOOD ORAL INTAKE, NO NAUSEA  -BG less than 180 on two consecutive checks:       "

## 2017-03-27 NOTE — PLAN OF CARE
"Problem: Discharge Planning  Goal: Discharge Planning (Adult, OB, Behavioral, Peds)  Outcome: No Change  OBSERVATION GOALS  -diagnostic tests and consults completed and resulted: PENDING  -vital signs normal or at patient baseline: /58 (BP Location: Left arm)  Pulse 83  Temp 97.7  F (36.5  C) (Oral)  Resp 16  Ht 1.854 m (6' 1\")  Wt 123.3 kg (271 lb 14.4 oz)  SpO2 98%  BMI 35.87 kg/m2  -tolerating oral intake to maintain hydration: GOOD ORAL INTAKE, NO NAUSEA  -BG less than 180 on two consecutive checks: LAST       "

## 2017-03-27 NOTE — DISCHARGE INSTRUCTIONS
You were already scheduled for Thursday 3/30 at 11:00 am at your Primary Care Clinic.  [place] [address] [place] [address]  Phone #: [phone]

## 2017-03-27 NOTE — PLAN OF CARE
Problem: Goal Outcome Summary  Goal: Goal Outcome Summary  Outcome: No Change  Blood glucose 292. Novolog scheduled with meals. Lantus increased 12U given today. Solumedrol IV started. Pt eager to Dc when able. Will conitnue to monitor.

## 2017-03-28 ENCOUNTER — INFUSION THERAPY VISIT (OUTPATIENT)
Dept: INFUSION THERAPY | Facility: CLINIC | Age: 57
End: 2017-03-28
Attending: PSYCHIATRY & NEUROLOGY
Payer: COMMERCIAL

## 2017-03-28 ENCOUNTER — CARE COORDINATION (OUTPATIENT)
Dept: CARDIOLOGY | Facility: CLINIC | Age: 57
End: 2017-03-28

## 2017-03-28 VITALS
SYSTOLIC BLOOD PRESSURE: 137 MMHG | DIASTOLIC BLOOD PRESSURE: 65 MMHG | HEART RATE: 53 BPM | TEMPERATURE: 97.6 F | RESPIRATION RATE: 18 BRPM | OXYGEN SATURATION: 97 %

## 2017-03-28 DIAGNOSIS — G37.9 CNS DEMYELINATING DISEASE (H): Primary | ICD-10-CM

## 2017-03-28 PROCEDURE — 25000128 H RX IP 250 OP 636: Mod: ZF | Performed by: PSYCHIATRY & NEUROLOGY

## 2017-03-28 PROCEDURE — 96365 THER/PROPH/DIAG IV INF INIT: CPT

## 2017-03-28 RX ADMIN — SODIUM CHLORIDE 1000 MG: 900 INJECTION, SOLUTION INTRAVENOUS at 15:42

## 2017-03-28 NOTE — PROGRESS NOTES
"[facility]  \"Hello, my name is Cami Sue , and I am calling from the [facility].  I want to check in and see how you are doing, after leaving the hospital.  You may also receive a call from your Care Coordinator (care team), but I want to make sure you don t have any urgent needs.  I have a couple questions to review with you:     Post-Discharge Outreach                                                    Jeff Lewis is a 56 year old male     Follow-up Appointments           Adult [facility] Follow-up and recommended labs and tests       Follow up with primary care provider on 3/30 or 3/31  Follow up with your MS specialist as you have had scheduled     Appointments on [place] and/or [facility] (with [facility] or [facility] provider or service). Call [phone] if you haven't heard regarding these appointments within 7 days of discharge.                       Your next 10 appointments already scheduled            Mar 28, 2017 4:00 PM CDT   Infusion 120 with UC SPEC INFUSION, UC 43 ATC   [facility] Specialty and Procedure ([facility])     [address] [place] [address] [phone]                  Mar 29, 2017 3:00 PM CDT   Infusion 120 with UC SPEC INFUSION, UC 43 ATC   [facility] Specialty and Procedure ([facility])     [address] [place] [address] [phone]                  Mar 30, 2017 8:00 AM CDT   (Arrive by 7:45 AM)   Return Multiple Sclerosis with Federico Rodríguez MD   [facility] Multiple Sclerosis ([facility])               Care Team:    Patient Care Team       Relationship Specialty Notifications Start End    Sukumar España PCP - General Family Practice  4/2/14     Phone: [phone] Fax: [phone]         [facility] [address] " [place] [address]    Talya Jaramillo, RN Nurse Coordinator Neurology Admissions 4/6/15     Comment:  Multiple Sclerosis Care Coordinator - Phone [phone] (not for patient use)    Phone: [phone] Pager: [phone]        Federico Rodríguez MD MD   12/15/15     Comment:  attending for neuro     Phone: [phone] Fax: [phone]         [facility] [address] [facility] [facility] [address]    Jeff Sahni MD MD Ophthalmology  12/24/15     Phone: [phone] Fax: [phone]         [address] [facility] [address]    Radha Mitchell MD MD Anesthesiology  11/11/16     Phone: [phone] Pager: [phone] Fax: [phone]        [facility] [address] [facility] [address]    Eligio Michelle DO MD Anesthesiology  12/27/16     Phone: [phone] Pager: [phone] Fax: [phone]        [facility] [address] [facility] [address]    Yana Vick, RN Nurse Coordinator Neurology Admissions 3/27/17     Comment:  Multiple Sclerosis Care Coordinator - Phone [phone] (not for patient use)            Transition of Care Review                                                      Did you have a surgery or procedure during your hospital visit? No   If yes, do you have any of the following:     Signs of infection:  No    Pain:  Yes     Pain Scale (0-10) 5/10     Location: Neck pain, headache    Wound/incision concerns? NA    Do you have all of your medications/refills?  Yes    Are you having any side effects or questions about your medication(s)? No    Do you have any new or worsening symptoms?  Yes- fatigued    Do you have any future appointments scheduled?   Yes     PCP appt 3/30      ------------------------------------> Patient Blood glucose this am-     280, 331, took insulin to lower             Plan                                                      Thanks for your time.  Your Care Coordinator may follow-up within the  next couple days.  In the meantime if you have questions, concerns or problems call your care team.        Cami Sue

## 2017-03-28 NOTE — MR AVS SNAPSHOT
After Visit Summary   3/28/2017    Jeff Lewis    MRN: [id number]           Patient Information     Date Of Birth          1960        Visit Information        Provider Department      3/28/2017 4:00 PM UC 43 ATC; UC SPEC INFUSION [facility] Specialty and Procedure        Today's Diagnoses     CNS demyelinating disease (H)    -  1       Follow-ups after your visit        Your next 10 appointments already scheduled     Mar 29, 2017  3:00 PM CDT   Infusion 120 with UC SPEC INFUSION, UC 43 ATC   [facility] Specialty and Procedure ([facility])    [address] [place] [address] [phone]            Mar 30, 2017  8:00 AM CDT   (Arrive by 7:45 AM)   Return Multiple Sclerosis with Federico Rodríguez MD   [facility] Multiple Sclerosis ([facility])    [address] [place] [address] [phone]              Who to contact     If you have questions or need follow up information about today's clinic visit or your schedule please contact [facility] SPECIALTY AND PROCEDURE directly at [phone].  Normal or non-critical lab and imaging results will be communicated to you by [employer], letter or phone within 4 business days after the clinic has received the results. If you do not hear from us within 7 days, please contact the clinic through [employer] or phone. If you have a critical or abnormal lab result, we will notify you by phone as soon as possible.  Submit refill requests through [employer]Bill or call your pharmacy and they will forward the refill request to us. Please allow 3 business days for your refill to be completed.          Additional Information About Your Visit        [employer] Information     DaisyBill lets you send messages to your doctor, view your test results, renew your prescriptions, schedule appointments and  "more. To sign up, go to [email or web address] . Click on \"Log in\" on the left side of the screen, which will take you to the Welcome page. Then click on \"Sign up Now\" on the right side of the page.     You will be asked to enter the access code listed below, as well as some personal information. Please follow the directions to create your username and password.     Your access code is: [email or web address]  Expires: 2017  6:30 AM     Your access code will  in 90 days. If you need help or a new code, please call your [place] clinic or [phone].        Care EveryWhere ID     This is your Care EveryWhere ID. This could be used by other organizations to access your [facility] [place] medical records  [id number]        Your Vitals Were     Pulse Temperature Respirations Pulse Oximetry          53 97.6  F (36.4  C) (Oral) 18 97%         Blood Pressure from Last 3 Encounters:   17 137/65   17 122/61   17 132/76    Weight from Last 3 Encounters:   17 123.3 kg (271 lb 14.4 oz)   17 117.9 kg (260 lb)   02/15/17 122.3 kg (269 lb 9.6 oz)              Today, you had the following     No orders found for display         Today's Medication Changes          These changes are accurate as of: 3/28/17  4:19 PM.  If you have any questions, ask your nurse or doctor.               These medicines have changed or have updated prescriptions.        Dose/Directions    Medication given by intrathecal pump   This may have changed:  additional instructions   Used for:  Cervicalgia        NO DRUG NEEDED.   Quantity:  1 each   Refills:  0                Primary Care Provider Office Phone # Fax #    Sukumarsteve España [phone]       [facility] [address]  [facility] [address]        Thank you!     Thank you for choosing [facility] SPECIALTY AND PROCEDURE  for your care. Our goal is always to provide you with excellent care. Hearing back from our " patients is one way we can continue to improve our services. Please take a few minutes to complete the written survey that you may receive in the mail after your visit with us. Thank you!             Your Updated Medication List - Protect others around you: Learn how to safely use, store and throw away your medicines at www.disposemymeds.org.          This list is accurate as of: 3/28/17  4:19 PM.  Always use your most recent med list.                   Brand Name Dispense Instructions for use    famotidine 10 MG tablet    PEPCID     Take 1 tablet (10 mg) by mouth 2 times daily       IBUPROFEN PO      Take 600 mg by mouth every 6 hours as needed for moderate pain       insulin glargine 100 UNIT/ML injection    LANTUS    3 mL    Inject 12 Units Subcutaneous 2 times daily for 2 days       * insulin lispro 100 UNIT/ML injection    HumaLOG KWIKpen    3 mL    Inject 8 Units Subcutaneous 3 times daily (before meals)       * HumaLOG KWIKpen 100 UNIT/ML injection   Generic drug:  insulin lispro          Medication given by intrathecal pump     1 each    NO DRUG NEEDED.       methylPREDNISolone sodium succinate 1,000 mg      Inject 1,000 mg into the vein every 24 hours       * Notice:  This list has 2 medication(s) that are the same as other medications prescribed for you. Read the directions carefully, and ask your doctor or other care provider to review them with you.

## 2017-03-28 NOTE — PROGRESS NOTES
Nursing Note  Jeff Lewis [facility]:   Chief Complaint   Patient presents with     Infusion     Solu-medrol     During [facility]'s [facility] appointment, orders from  Dr. Rodríguez were completed.  Frequency: today is dose 3 of 5 total.    Progress note:  Patient identification verified by name and date of birth.  Assessment completed.  Vitals recorded in Doc Flowsheets.  Patient was provided with education regarding infusion and possible side effects.  Patient verbalized understanding.      needed: No  Premedications: were not ordered.  Infusion Rates: infusion given over approximately 1 hour.  Approximate Infusion length:1 hours.   Labs: were not ordered for this appointment.  Vascular access: peripheral IV placed today   Treatment Conditions: none  Patient tolerated infusion: well.      Discharge Plan:   Follow up plan of care with: ongoing infusions at [facility].  Discharge instructions were reviewed with patient.  Patient/representative verbalized understanding of discharge instructions and all questions answered.  Patient discharged [facility] in stable condition.    Talya Benitez RN    Administrations This Visit     methylPREDNISolone sodium succinate (solu-MEDROL) 1,000 mg in NaCl 0.9 % 250 mL intermittent infusion     Admin Date Action Dose Route Administered By             03/28/2017 New Bag 1000 mg Intravenous Talya Benitez RN                          /65 (BP Location: Left arm)  Pulse 53  Temp 97.6  F (36.4  C) (Oral)  Resp 18  SpO2 97%

## 2017-03-29 ENCOUNTER — INFUSION THERAPY VISIT (OUTPATIENT)
Dept: INFUSION THERAPY | Facility: CLINIC | Age: 57
End: 2017-03-29
Attending: PSYCHIATRY & NEUROLOGY
Payer: COMMERCIAL

## 2017-03-29 VITALS
HEART RATE: 62 BPM | SYSTOLIC BLOOD PRESSURE: 154 MMHG | DIASTOLIC BLOOD PRESSURE: 72 MMHG | OXYGEN SATURATION: 97 % | TEMPERATURE: 97.7 F

## 2017-03-29 DIAGNOSIS — G37.9 CNS DEMYELINATING DISEASE (H): Primary | ICD-10-CM

## 2017-03-29 PROCEDURE — 25000128 H RX IP 250 OP 636: Mod: ZF | Performed by: PSYCHIATRY & NEUROLOGY

## 2017-03-29 PROCEDURE — 96365 THER/PROPH/DIAG IV INF INIT: CPT

## 2017-03-29 RX ADMIN — SODIUM CHLORIDE 1000 MG: 9 INJECTION, SOLUTION INTRAVENOUS at 15:11

## 2017-03-29 NOTE — PROGRESS NOTES
Nursing Note  Jeff Lewis [facility] for:   Chief Complaint   Patient presents with     Infusion     Solumedrol     During [facility]'s [facility] appointment, orders from Dr. Rodríguez were completed.  Frequency: today is dose 5 of 5 total. Therapy plan still listing 2 remaining doses however patient received these in the hospital.    Progress note:  Patient identification verified by name and date of birth.  Assessment completed.  Vitals recorded in Doc Flowsheets.  Patient was provided with education regarding infusion and possible side effects.  Patient verbalized understanding.  Patient complaining of side effects from steroids. Today is the final dose. MD is aware of these. He sees Dr. Griggs tomorrow.      needed: No  Premedications: were not ordered.  Infusion Rates: infusion given over approximately 1 hour.  Labs: were not ordered for this appointment.  Vascular access: peripheral IV placed today.  Treatment Conditions: patient denies fever, chills, signs of infection, recent illness, on antibiotics, productive cough or elevated temperature.  Patient tolerated infusion: well.      Discharge Plan:   Follow up plan of care with: Primary MD  Discharge instructions were reviewed with patient.  Patient/representative verbalized understanding of discharge instructions and all questions answered.  Patient discharged [facility] in stable condition.    Ericka Severson, RN    Administrations This Visit     methylPREDNISolone sodium succinate (solu-MEDROL) 1,000 mg in NaCl 0.9 % 250 mL intermittent infusion     Admin Date Action Dose Rate Route Administered By          03/29/2017 New Bag 1000 mg 291 mL/hr Intravenous Severson, Ericka, RN                         /71  Pulse 62  Temp 97.7  F (36.5  C) (Oral)  SpO2 97%

## 2017-03-29 NOTE — MR AVS SNAPSHOT
"              After Visit Summary   3/29/2017    Jeff Lewis    MRN: [id number]           Patient Information     Date Of Birth          1960        Visit Information        Provider Department      3/29/2017 3:00 PM UC 43 ATC; UC SPEC INFUSION [facility] Specialty and Procedure        Today's Diagnoses     CNS demyelinating disease (H)    -  1       Follow-ups after your visit        Your next 10 appointments already scheduled     Mar 30, 2017  8:00 AM CDT   (Arrive by 7:45 AM)   Return Multiple Sclerosis with Federico Rodríguez MD   [facility] Multiple Sclerosis ([facility])    [address] [place] [address] [phone]              Who to contact     If you have questions or need follow up information about today's clinic visit or your schedule please contact [facility] SPECIALTY AND PROCEDURE directly at [phone].  Normal or non-critical lab and imaging results will be communicated to you by [employer], letter or phone within 4 business days after the clinic has received the results. If you do not hear from us within 7 days, please contact the clinic through [employer] or phone. If you have a critical or abnormal lab result, we will notify you by phone as soon as possible.  Submit refill requests through Bangbite or call your pharmacy and they will forward the refill request to us. Please allow 3 business days for your refill to be completed.          Additional Information About Your Visit        MyChart Information     Bang[employer] lets you send messages to your doctor, view your test results, renew your prescriptions, schedule appointments and more. To sign up, go to [email or web address] . Click on \"Log in\" on the left side of the screen, which will take you to the Welcome page. Then click on \"Sign up Now\" on the right side of the page.     You will be asked to enter the access code listed below, as " well as some personal information. Please follow the directions to create your username and password.     Your access code is: [email or web address]  Expires: 2017  6:30 AM     Your access code will  in 90 days. If you need help or a new code, please call your [place] clinic or [phone].        Care EveryWhere ID     This is your Care EveryWhere ID. This could be used by other organizations to access your [facility] medical records  [id number]        Your Vitals Were     Pulse Temperature Pulse Oximetry             62 97.7  F (36.5  C) (Oral) 97%          Blood Pressure from Last 3 Encounters:   17 154/72   17 137/65   17 122/61    Weight from Last 3 Encounters:   17 123.3 kg (271 lb 14.4 oz)   17 117.9 kg (260 lb)   02/15/17 122.3 kg (269 lb 9.6 oz)              Today, you had the following     No orders found for display         Today's Medication Changes          These changes are accurate as of: 3/29/17  4:22 PM.  If you have any questions, ask your nurse or doctor.               These medicines have changed or have updated prescriptions.        Dose/Directions    Medication given by intrathecal pump   This may have changed:  additional instructions   Used for:  Cervicalgia        NO DRUG NEEDED.   Quantity:  1 each   Refills:  0                Primary Care Provider Office Phone # Fax #    Sukumar España [phone]       [facility] [address] [address]  [facility] [address]        Thank you!     Thank you for choosing [facility] SPECIALTY [facility] PROCEDURE  for your care. Our goal is always to provide you with excellent care. Hearing back from our patients is one way we can continue to improve our services. Please take a few minutes to complete the written survey that you may receive in the mail after your visit with us. Thank you!             Your Updated Medication List - Protect others around you: Learn how to safely  use, store and throw away your medicines at www.disposemymeds.org.          This list is accurate as of: 3/29/17  4:22 PM.  Always use your most recent med list.                   Brand Name Dispense Instructions for use    famotidine 10 MG tablet    PEPCID     Take 1 tablet (10 mg) by mouth 2 times daily       IBUPROFEN PO      Take 600 mg by mouth every 6 hours as needed for moderate pain       insulin glargine 100 UNIT/ML injection    LANTUS    3 mL    Inject 12 Units Subcutaneous 2 times daily for 2 days       * insulin lispro 100 UNIT/ML injection    HumaLOG KWIKpen    3 mL    Inject 8 Units Subcutaneous 3 times daily (before meals)       * HumaLOG KWIKpen 100 UNIT/ML injection   Generic drug:  insulin lispro          Medication given by intrathecal pump     1 each    NO DRUG NEEDED.       methylPREDNISolone sodium succinate 1,000 mg      Inject 1,000 mg into the vein every 24 hours       * Notice:  This list has 2 medication(s) that are the same as other medications prescribed for you. Read the directions carefully, and ask your doctor or other care provider to review them with you.

## 2017-03-30 ENCOUNTER — OFFICE VISIT (OUTPATIENT)
Dept: NEUROLOGY | Facility: CLINIC | Age: 57
End: 2017-03-30
Attending: PSYCHIATRY & NEUROLOGY
Payer: COMMERCIAL

## 2017-03-30 ENCOUNTER — TELEPHONE (OUTPATIENT)
Dept: NEUROLOGY | Facility: CLINIC | Age: 57
End: 2017-03-30

## 2017-03-30 VITALS — HEIGHT: 73 IN | SYSTOLIC BLOOD PRESSURE: 131 MMHG | HEART RATE: 63 BPM | DIASTOLIC BLOOD PRESSURE: 59 MMHG

## 2017-03-30 DIAGNOSIS — H05.89 MASS OF ORBIT: ICD-10-CM

## 2017-03-30 DIAGNOSIS — G35 MULTIPLE SCLEROSIS (H): Primary | ICD-10-CM

## 2017-03-30 PROCEDURE — 40000809 ZZH STATISTIC NO DOCUMENTATION TO SUPPORT CHARGE

## 2017-03-30 ASSESSMENT — PAIN SCALES - GENERAL: PAINLEVEL: MODERATE PAIN (5)

## 2017-03-30 NOTE — LETTER
"3/30/2017      RE: Jeff Lewis  [address] [place] [address]       Referral source: Established patient      Chief complaint: Multiple sclerosis     History of the Present Illness: Mr. Jeff Lewis is a 56-year-old man who returns to the Multiple Sclerosis Clinic today for scheduled follow-up after recent MRI scans of the brain.      Unfortunately, the patient has had a new symptom over the last 2-1/2 weeks.  He relates that 16 days ago he awoke one morning with a \"Novocain\"-like sensation on the left side of the jaw.  Over the next few days, this evolved to involve the skin around the left eye as well as the left ear.  He has also noted just recently that his voice sounds more \"raspy\" and he has had some difficulty with swallowing due to discomfort.  Additionally, he relates that he seems generally weak and that his balance seems a bit more off over the last couple of weeks.      The patient had previously obtained an MRI scan of the brain through his primary care physician that demonstrated a new lesion in the left middle cerebellar peduncle in comparison to previous MRI performed in September 2016.  We also obtained contrast-enhanced imaging on 03/23/2017.  I reviewed those images.  These clearly show a new enhancing lesion at the border of the left dorsal joan and the left middle cerebellar peduncle that presumably accounts for his current symptoms.      The patient did wish to pursue pulse corticosteroid therapy and we treated him with 5 days of IV methylprednisolone.  Unfortunately, his blood sugars became quite elevated and he was transiently hospitalized for hyperglycemia.      PHYSICAL EXAMINATION:   VITAL SIGNS:  Blood pressure 131/59; pulse 63; height 1.85 meters.   GENERAL:  Obese man who presents to the evaluation accompanied by his wife, awake and alert and in no acute distress.      Assessment/plan:    1.  Relapsing-remitting multiple sclerosis  Mr. Lewis was previously " treated with interferon beta-1b and subsequently with glatiramer acetate following his initial diagnosis 10 years ago.  Both of these medications were stopped a number of years before I met him because it was not felt clear that these were doing anything for him.  Indeed, he has been quite stable as regards any new clinical relapses or new MRI lesions in 3 years that I have known him, prior to this episode.      Nevertheless, this does clearly seem to represent a new relapse of MS.  I discussed the possibility of disease-modifying therapy with the patient.  I told him that given the relatively low level of disease activity it will be difficult to differentiate clinical quiescence due to an effect of medication from the natural history of this condition, particularly in the short-term.  However, if he would prefer to be back on disease-modifying therapy to try to reduce the likelihood of similar episodes in the future, I think that would be very reasonable.      The patient indicates that he does want to be back on treatment.  He did tolerate Copaxone well in the past and we are going to restart the 40 mg 3 time weekly formulation of that medication.  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      Lastly, I advised the patient to take at least 2000 units of vitamin D by mouth daily.  He has not been taking vitamin D supplementation.     I spent 25 minutes with the patient in the office today, with greater than 50 percent of this time spent in counseling.    Federico Rodríguez MD   of Neurology  [facility]    Cc:  Sukumar España MD (PCP)  Patient

## 2017-03-30 NOTE — TELEPHONE ENCOUNTER
Prior Authorization Approval    Authorization Effective Date: 3/30/2017  Authorization Expiration Date:    Medication: Copaxone 40mg  Approved Dose/Quantity: 12 per 28 days  Reference #:     Insurance Company: [employer] - Phone [phone] Fax [phone]  Expected CoPay: $200     CoPay Card Available: Yes Comment:  Patient may call [employer] at tel [phone]  Foundation Assistance Needed:    Which Pharmacy is filling the prescription (Not needed for infusion/clinic administered): [facility] - [place], [place] - [address] [place] [address]  Pharmacy Notified: Yes  Patient Notified: Yes, pharmacy to contact patient.    Start Form faxed to [employer] at tel [phone] and sent to scanning

## 2017-03-30 NOTE — PROGRESS NOTES
"Referral source: Established patient      Chief complaint: Multiple sclerosis     History of the Present Illness: Mr. Jeff Lewis is a 56-year-old man who returns to the [facility]clerosis [facility] today for scheduled follow-up after recent MRI scans of the brain.      Unfortunately, the patient has had a new symptom over the last 2-1/2 weeks.  He relates that 16 days ago he awoke one morning with a \"Novocain\"-like sensation on the left side of the jaw.  Over the next few days, this evolved to involve the skin around the left eye as well as the left ear.  He has also noted just recently that his voice sounds more \"raspy\" and he has had some difficulty with swallowing due to discomfort.  Additionally, he relates that he seems generally weak and that his balance seems a bit more off over the last couple of weeks.      The patient had previously obtained an MRI scan of the brain through his primary care physician that demonstrated a new lesion in the left middle cerebellar peduncle in comparison to previous MRI performed in September 2016.  We also obtained contrast-enhanced imaging on 03/23/2017.  I reviewed those images.  These clearly show a new enhancing lesion at the border of the left dorsal joan and the left middle cerebellar peduncle that presumably accounts for his current symptoms.      The patient did wish to pursue pulse corticosteroid therapy and we treated him with 5 days of IV methylprednisolone.  Unfortunately, his blood sugars became quite elevated and he was transiently hospitalized for hyperglycemia.      PHYSICAL EXAMINATION:   VITAL SIGNS:  Blood pressure 131/59; pulse 63; height 1.85 meters.   GENERAL:  Obese man who presents to the evaluation accompanied by his wife, awake and alert and in no acute distress.      Assessment/plan:    1.  Relapsing-remitting multiple sclerosis  Mr. Lewis was previously treated with interferon beta-1b and subsequently with glatiramer acetate following his " initial diagnosis 10 years ago.  Both of these medications were stopped a number of years before I met him because it was not felt clear that these were doing anything for him.  Indeed, he has been quite stable as regards any new clinical relapses or new MRI lesions in 3 years that I have known him, prior to this episode.      Nevertheless, this does clearly seem to represent a new relapse of MS.  I discussed the possibility of disease-modifying therapy with the patient.  I told him that given the relatively low level of disease activity it will be difficult to differentiate clinical quiescence due to an effect of medication from the natural history of this condition, particularly in the short-term.  However, if he would prefer to be back on disease-modifying therapy to try to reduce the likelihood of similar episodes in the future, I think that would be very reasonable.      The patient indicates that he does want to be back on treatment.  He did tolerate Copaxone well in the past and we are going to restart the 40 mg 3 time weekly formulation of that medication.  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      Lastly, I advised the patient to take at least 2000 units of vitamin D by mouth daily.  He has not been taking vitamin D supplementation.     I spent 25 minutes with the patient in the office today, with greater than 50 percent of this time spent in counseling.     MD MEÑO Brennan MD             D: 2017 10:42   T: 2017 15:34   MT: NICO      Name:     KEYONNA JENKINS   MRN:      [id number]        Account:      [id number]   :      1960           Service Date: 2017      Document: [id number]

## 2017-03-30 NOTE — PATIENT INSTRUCTIONS
1. We will fill out the forms to initiate Copaxone 40 mg three times per week injections today    2. Advise vitamin D 2000 units by mouth daily     3. Repeat MRI brain and orbits in 6 months

## 2017-03-30 NOTE — Clinical Note
"3/30/2017       RE: Jeff Lewis  [address] [place] [address]     Dear Colleague,    Thank you for referring your patient, Jeff Lewis, to the [facility] MULTIPLE SCLEROSIS at [facility]. Please see a copy of my visit note below.    Referral source: Established patient      Chief complaint: Multiple sclerosis     History of the Present Illness: Mr. Jeff Lewis is a 56-year-old man who returns to the Multiple Sclerosis Clinic today for scheduled follow-up after recent MRI scans of the brain.      Unfortunately, the patient has had a new symptom over the last 2-1/2 weeks.  He relates that 16 days ago he awoke one morning with a \"Novocain\"-like sensation on the left side of the jaw.  Over the next few days, this evolved to involve the skin around the left eye as well as the left ear.  He has also noted just recently that his voice sounds more \"raspy\" and he has had some difficulty with swallowing due to discomfort.  Additionally, he relates that he seems generally weak and that his balance seems a bit more off over the last couple of weeks.      The patient had previously obtained an MRI scan of the brain through his primary care physician that demonstrated a new lesion in the left middle cerebellar peduncle in comparison to previous MRI performed in September 2016.  We also obtained contrast-enhanced imaging on 03/23/2017.  I reviewed those images.  These clearly show a new enhancing lesion at the border of the left dorsal joan and the left middle cerebellar peduncle that presumably accounts for his current symptoms.      The patient did wish to pursue pulse corticosteroid therapy and we treated him with 5 days of IV methylprednisolone.  Unfortunately, his blood sugars became quite elevated and he was transiently hospitalized for hyperglycemia.      PHYSICAL EXAMINATION:   VITAL SIGNS:  Blood pressure 131/59; pulse 63; height 1.85 meters.   GENERAL:  " Obese man who presents to the evaluation accompanied by his wife, awake and alert and in no acute distress.      Assessment/plan:    1.  Relapsing-remitting multiple sclerosis  Mr. Lewis was previously treated with interferon beta-1b and subsequently with glatiramer acetate following his initial diagnosis 10 years ago.  Both of these medications were stopped a number of years before I met him because it was not felt clear that these were doing anything for him.  Indeed, he has been quite stable as regards any new clinical relapses or new MRI lesions in 3 years that I have known him, prior to this episode.      Nevertheless, this does clearly seem to represent a new relapse of MS.  I discussed the possibility of disease-modifying therapy with the patient.  I told him that given the relatively low level of disease activity it will be difficult to differentiate clinical quiescence due to an effect of medication from the natural history of this condition, particularly in the short-term.  However, if he would prefer to be back on disease-modifying therapy to try to reduce the likelihood of similar episodes in the future, I think that would be very reasonable.      The patient indicates that he does want to be back on treatment.  He did tolerate Copaxone well in the past and we are going to restart the 40 mg 3 time weekly formulation of that medication.  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      Lastly, I advised the patient to take at least 2000 units of vitamin D by mouth daily.  He has not been taking vitamin D supplementation.     I spent 25 minutes with the patient in the office today, with greater than 50 percent of this time spent in counseling.     MD MEÑO Brennan MD             D: 03/30/2017 10:42   T: 03/30/2017 15:34   MT: AKA       Name:     KEYONNA JENKINS   MRN:      [id number]        Account:      [id number]   :      1960           Service Date: 2017      Document: [id number]        Again, thank you for allowing me to participate in the care of your patient.      Sincerely,    Federico Rodríguez MD

## 2017-03-30 NOTE — MR AVS SNAPSHOT
After Visit Summary   3/30/2017    Jeff Lewis    MRN: [id number]           Patient Information     Date Of Birth          1960        Visit Information        Provider Department      3/30/2017 8:00 AM Federico Rodríguez MD [facility] Multiple Sclerosis        Today's Diagnoses     Multiple sclerosis (H)    -  1    Mass of orbit          Care Instructions    1. We will fill out the forms to initiate Copaxone 40 mg three times per week injections today    2. Advise vitamin D 2000 units by mouth daily     3. Repeat MRI brain and orbits in 6 months        Follow-ups after your visit        Follow-up notes from your care team     Return in about 6 months (around 9/30/2017).      Your next 10 appointments already scheduled     Oct 05, 2017 10:00 AM CDT   MR BRAIN AND ORBITS with UR1   [facility], [place], [facility] ([facility])    [address] [address] [phone]           Take your medicines as usual, unless your doctor tells you not to. Bring a list of your current medicines to your exam (including vitamins, minerals and over-the-counter drugs). Also bring the results of similar scans you may have had.  Please remove any body piercings and hair extensions before you arrive.  Follow your doctor s orders. If you do not, we may have to postpone your exam.  You will not have contrast for this exam. You do not need to do anything special to prepare.  The MRI machine uses a strong magnet. Please wear clothes without metal (snaps, zippers). A sweatsuit works well, or we may give you a hospital gown.   **IMPORTANT** THE INSTRUCTIONS BELOW ARE ONLY FOR THOSE PATIENTS WHO HAVE BEEN TOLD THEY WILL RECEIVE SEDATION OR GENERAL ANESTHESIA DURING THEIR MRI PROCEDURE:  IF YOU WILL RECEIVE SEDATION (take medicine to help you relax during your exam):   You must get the medicine from your doctor before you arrive. Bring the medicine to the  exam. Do not take it at home.   Arrive one hour early. Bring someone who can take you home after the test. Your medicine will make you sleepy. After the exam, you may not drive, take a bus or take a taxi by yourself.   No eating 8 hours before your exam. You may have clear liquids up until 4 hours before your exam. (Clear liquids include water, clear tea, black coffee and fruit juice without pulp.)  IF YOU WILL RECEIVE ANESTHESIA (be asleep for your exam):   Arrive 1 1/2 hours early. Bring someone who can take you home after the test. You may not drive, take a bus or take a taxi by yourself.   No eating 8 hours before your exam. You may have clear liquids up until 4 hours before your exam. (Clear liquids include water, clear tea, black coffee and fruit juice without pulp.)   You will spend four to five hours in the recovery room.  Please call the Imaging Department at your exam site with any questions.            Oct 05, 2017  1:30 PM CDT   (Arrive by 1:15 PM)   Return Multiple Sclerosis with Federico Rodríguez MD   [facility] Multiple Sclerosis ([facility])    [address] [place] [id number] [phone]              Future tests that were ordered for you today     Open Future Orders        Priority Expected Expires Ordered    MRI Brain and orbits Routine 9/28/2017 3/30/2018 3/30/2017            Who to contact     If you have questions or need follow up information about today's clinic visit or your schedule please contact [facility] MULTIPLE SCLEROSIS directly at [phone].  Normal or non-critical lab and imaging results will be communicated to you by MyChart, letter or phone within 4 business days after the clinic has received the results. If you do not hear from us within 7 days, please contact the clinic through MyChart or phone. If you have a critical or abnormal lab result, we will notify you by phone as soon as possible.  Submit refill requests through  "[employer] or call your pharmacy and they will forward the refill request to us. Please allow 3 business days for your refill to be completed.          Additional Information About Your Visit        MyChart Information     [employer] lets you send messages to your doctor, view your test results, renew your prescriptions, schedule appointments and more. To sign up, go to [email or web address] . Click on \"Log in\" on the left side of the screen, which will take you to the Welcome page. Then click on \"Sign up Now\" on the right side of the page.     You will be asked to enter the access code listed below, as well as some personal information. Please follow the directions to create your username and password.     Your access code is: [email or web address]  Expires: 2017  6:30 AM     Your access code will  in 90 days. If you need help or a new code, please call your [place] clinic or [phone].        Care EveryWhere ID     This is your Care EveryWhere ID. This could be used by other organizations to access your [facility] medical records  [id number]        Your Vitals Were     Pulse Height                63 1.854 m (6' 1\")           Blood Pressure from Last 3 Encounters:   17 131/59   17 154/72   17 137/65    Weight from Last 3 Encounters:   17 123.3 kg (271 lb 14.4 oz)   17 117.9 kg (260 lb)   02/15/17 122.3 kg (269 lb 9.6 oz)                 Today's Medication Changes          These changes are accurate as of: 3/30/17  9:05 AM.  If you have any questions, ask your nurse or doctor.               These medicines have changed or have updated prescriptions.        Dose/Directions    Medication given by intrathecal pump   This may have changed:  additional instructions   Used for:  Cervicalgia        NO DRUG NEEDED.   Quantity:  1 each   Refills:  0                Primary Care Provider Office Phone # Fax #    Sukumar LETTY España [phone]       [facility] [address]  [facility] [address]        Thank you!     Thank you for choosing [facility] MULTIPLE SCLEROSIS  for your care. Our goal is always to provide you with excellent care. Hearing back from our patients is one way we can continue to improve our services. Please take a few minutes to complete the written survey that you may receive in the mail after your visit with us. Thank you!             Your Updated Medication List - Protect others around you: Learn how to safely use, store and throw away your medicines at www.disposemymeds.org.          This list is accurate as of: 3/30/17  9:05 AM.  Always use your most recent med list.                   Brand Name Dispense Instructions for use    famotidine 10 MG tablet    PEPCID     Take 1 tablet (10 mg) by mouth 2 times daily       IBUPROFEN PO      Take 600 mg by mouth every 6 hours as needed for moderate pain       insulin glargine 100 UNIT/ML injection    LANTUS    3 mL    Inject 12 Units Subcutaneous 2 times daily for 2 days       * insulin lispro 100 UNIT/ML injection    HumaLOG KWIKpen    3 mL    Inject 8 Units Subcutaneous 3 times daily (before meals)       * HumaLOG KWIKpen 100 UNIT/ML injection   Generic drug:  insulin lispro          Medication given by intrathecal pump     1 each    NO DRUG NEEDED.       * Notice:  This list has 2 medication(s) that are the same as other medications prescribed for you. Read the directions carefully, and ask your doctor or other care provider to review them with you.

## 2017-03-30 NOTE — TELEPHONE ENCOUNTER
Prior Authorization Specialty Medication Request    Medication/Dose: Copaxone 40mg  Diagnosis and ICD: Relapsing Remitting Multiple Sclerosis, G35  New/Renewal/Insurance Change PA: New    Important Lab Values: n/a    Previously Tried and Failed Therapies: Copaxone 20mg    Rationale: Initiation of disease modifying therapy for demyelinating disease, please approve.    Would you like to include any research articles? n/a   If yes please include the hyperlink(s) below or fax @ [phone].    (Include Name and MRN)    If you received a fax notification from an outside Pharmacy;  Pharmacy Name: n/a  Pharmacy #: n/a  Pharmacy Fax: n/a

## 2017-04-01 LAB
BACTERIA SPEC CULT: NO GROWTH
BACTERIA SPEC CULT: NO GROWTH
MICRO REPORT STATUS: NORMAL
MICRO REPORT STATUS: NORMAL
SPECIMEN SOURCE: NORMAL
SPECIMEN SOURCE: NORMAL

## 2017-04-17 ENCOUNTER — TELEPHONE (OUTPATIENT)
Dept: NEUROLOGY | Facility: CLINIC | Age: 57
End: 2017-04-17

## 2017-04-17 NOTE — TELEPHONE ENCOUNTER
"I received the following message from the triage nurse:    Takes copaxine in pm and hives appear next morning. Symptom hives come and go face,chest, back, hips, stomach everyday since 04/09. Hives disappear after he takes the benedryl. New med is copaxine. PCP Sukumar Mims thinks it is stress related and having pt taking bendryl 25 mg qd.    I called David, and he said that he restarted his Copaxone on 4/5/17, and the next day, he got hives on his upper body, but not until later in the morning on the day after he did his injection; He has had hives the last 6 out of 8 days; He does his injection, then gets hives the following day mid-morning; Benadryl 25mg is very helpful for him; He said that his body itches and feels like it's \"on fire\"; The hives are raised and reddened; I told him that I would send this over to Dr Rodríguez for input; He said that his PCP feels it may be stress related, but not sure; I will call David back once I have heard from Dr Rodríguez.    Talya Jaramillo, MS RN Care Coordinator    "

## 2017-04-17 NOTE — TELEPHONE ENCOUNTER
I think it is reasonable for him to continue the medication and pre-medicate with Benadryl as he is doing. If he finds Benadryl helpful but is bothered by sedation, could substitute one Claritin (loratadine) 10 mg tab daily as an alternative.

## 2017-04-18 NOTE — TELEPHONE ENCOUNTER
I called David back with Dr Rodríguez's input; He said that the Benadryl makes his a little sleepy, but tolerates it pretty well overall; I told him that trying the Claritin might not be a bad idea to see if it works just as well without the sedating side effects; I am going to give him a call in 2 weeks to see how things are going, and he knows that he can certainly call us back sooner if things worsen or are completely intolerable.    Talya Jaramillo, MS RN Care Coordinator

## 2017-10-03 ENCOUNTER — NURSE TRIAGE (OUTPATIENT)
Dept: NURSING | Facility: CLINIC | Age: 57
End: 2017-10-03

## 2017-10-03 ENCOUNTER — TELEPHONE (OUTPATIENT)
Dept: NEUROLOGY | Facility: CLINIC | Age: 57
End: 2017-10-03

## 2017-10-04 NOTE — TELEPHONE ENCOUNTER
Jeff called wanting to make sure he will be getting IV sedation as he has in the past with his MRI.  He usually checks in for his MRI's one hour early to get the IV  Please call Jeff before 10/5/2017, day of MRI  Routed: Dr Anne DUMONT, RN [facility]

## 2017-10-05 ENCOUNTER — HOSPITAL ENCOUNTER (OUTPATIENT)
Dept: INTERVENTIONAL RADIOLOGY/VASCULAR | Facility: CLINIC | Age: 57
End: 2017-10-05
Attending: PSYCHIATRY & NEUROLOGY | Admitting: PSYCHIATRY & NEUROLOGY
Payer: COMMERCIAL

## 2017-10-05 ENCOUNTER — OFFICE VISIT (OUTPATIENT)
Dept: NEUROLOGY | Facility: CLINIC | Age: 57
End: 2017-10-05
Attending: PSYCHIATRY & NEUROLOGY
Payer: COMMERCIAL

## 2017-10-05 ENCOUNTER — HOSPITAL ENCOUNTER (OUTPATIENT)
Facility: CLINIC | Age: 57
Discharge: HOME | End: 2017-10-05
Attending: PSYCHIATRY & NEUROLOGY | Admitting: PSYCHIATRY & NEUROLOGY
Payer: COMMERCIAL

## 2017-10-05 ENCOUNTER — APPOINTMENT (OUTPATIENT)
Dept: MEDSURG UNIT | Facility: CLINIC | Age: 57
End: 2017-10-05
Attending: PSYCHIATRY & NEUROLOGY
Payer: COMMERCIAL

## 2017-10-05 VITALS
SYSTOLIC BLOOD PRESSURE: 119 MMHG | OXYGEN SATURATION: 97 % | RESPIRATION RATE: 16 BRPM | DIASTOLIC BLOOD PRESSURE: 75 MMHG | HEART RATE: 59 BPM

## 2017-10-05 VITALS
SYSTOLIC BLOOD PRESSURE: 114 MMHG | OXYGEN SATURATION: 97 % | RESPIRATION RATE: 16 BRPM | HEART RATE: 59 BPM | WEIGHT: 250 LBS | HEIGHT: 73 IN | BODY MASS INDEX: 33.13 KG/M2 | TEMPERATURE: 97.7 F | DIASTOLIC BLOOD PRESSURE: 69 MMHG

## 2017-10-05 VITALS
DIASTOLIC BLOOD PRESSURE: 71 MMHG | SYSTOLIC BLOOD PRESSURE: 105 MMHG | WEIGHT: 255 LBS | HEIGHT: 73 IN | HEART RATE: 64 BPM | BODY MASS INDEX: 33.8 KG/M2

## 2017-10-05 DIAGNOSIS — G35 MULTIPLE SCLEROSIS (H): ICD-10-CM

## 2017-10-05 DIAGNOSIS — H05.89 MASS OF ORBIT: ICD-10-CM

## 2017-10-05 DIAGNOSIS — G89.4 CHRONIC PAIN SYNDROME: ICD-10-CM

## 2017-10-05 DIAGNOSIS — H05.89 ORBITAL MASS: ICD-10-CM

## 2017-10-05 DIAGNOSIS — G35 MS (MULTIPLE SCLEROSIS) (H): Primary | ICD-10-CM

## 2017-10-05 PROCEDURE — A9585 GADOBUTROL INJECTION: HCPCS | Performed by: PSYCHIATRY & NEUROLOGY

## 2017-10-05 PROCEDURE — 99212 OFFICE O/P EST SF 10 MIN: CPT | Mod: 25,ZF

## 2017-10-05 PROCEDURE — 25000128 H RX IP 250 OP 636: Performed by: PSYCHIATRY & NEUROLOGY

## 2017-10-05 PROCEDURE — 25000128 H RX IP 250 OP 636: Performed by: RADIOLOGY

## 2017-10-05 PROCEDURE — 70553 MRI BRAIN STEM W/O & W/DYE: CPT

## 2017-10-05 PROCEDURE — 40000166 ZZH STATISTIC PP CARE STAGE 1

## 2017-10-05 RX ORDER — FENTANYL CITRATE 50 UG/ML
25-50 INJECTION, SOLUTION INTRAMUSCULAR; INTRAVENOUS EVERY 5 MIN PRN
Status: DISCONTINUED | OUTPATIENT
Start: 2017-10-05 | End: 2017-10-05 | Stop reason: HOSPADM

## 2017-10-05 RX ORDER — FLUMAZENIL 0.1 MG/ML
0.2 INJECTION, SOLUTION INTRAVENOUS
Status: DISCONTINUED | OUTPATIENT
Start: 2017-10-05 | End: 2017-10-05 | Stop reason: HOSPADM

## 2017-10-05 RX ORDER — NALOXONE HYDROCHLORIDE 0.4 MG/ML
.1-.4 INJECTION, SOLUTION INTRAMUSCULAR; INTRAVENOUS; SUBCUTANEOUS
Status: DISCONTINUED | OUTPATIENT
Start: 2017-10-05 | End: 2017-10-05 | Stop reason: HOSPADM

## 2017-10-05 RX ORDER — GLATIRAMER 40 MG/ML
40 INJECTION, SOLUTION SUBCUTANEOUS DAILY
COMMUNITY

## 2017-10-05 RX ORDER — SODIUM CHLORIDE 9 MG/ML
INJECTION, SOLUTION INTRAVENOUS CONTINUOUS
Status: DISCONTINUED | OUTPATIENT
Start: 2017-10-05 | End: 2017-10-05 | Stop reason: HOSPADM

## 2017-10-05 RX ORDER — GADOBUTROL 604.72 MG/ML
10 INJECTION INTRAVENOUS ONCE
Status: COMPLETED | OUTPATIENT
Start: 2017-10-05 | End: 2017-10-05

## 2017-10-05 RX ORDER — LIDOCAINE 40 MG/G
CREAM TOPICAL
Status: DISCONTINUED | OUTPATIENT
Start: 2017-10-05 | End: 2017-10-05 | Stop reason: HOSPADM

## 2017-10-05 RX ADMIN — FENTANYL CITRATE 50 MCG: 50 INJECTION, SOLUTION INTRAMUSCULAR; INTRAVENOUS at 11:24

## 2017-10-05 RX ADMIN — GADOBUTROL 10 ML: 604.72 INJECTION INTRAVENOUS at 10:28

## 2017-10-05 RX ADMIN — MIDAZOLAM 1 MG: 1 INJECTION INTRAMUSCULAR; INTRAVENOUS at 11:38

## 2017-10-05 RX ADMIN — MIDAZOLAM 3.5 MG: 1 INJECTION INTRAMUSCULAR; INTRAVENOUS at 11:18

## 2017-10-05 RX ADMIN — MIDAZOLAM 0.5 MG: 1 INJECTION INTRAMUSCULAR; INTRAVENOUS at 11:25

## 2017-10-05 RX ADMIN — FENTANYL CITRATE 200 MCG: 50 INJECTION, SOLUTION INTRAMUSCULAR; INTRAVENOUS at 11:16

## 2017-10-05 RX ADMIN — SODIUM CHLORIDE: 9 INJECTION, SOLUTION INTRAVENOUS at 09:40

## 2017-10-05 RX ADMIN — FENTANYL CITRATE 50 MCG: 50 INJECTION, SOLUTION INTRAMUSCULAR; INTRAVENOUS at 11:38

## 2017-10-05 ASSESSMENT — PAIN SCALES - GENERAL: PAINLEVEL: MILD PAIN (3)

## 2017-10-05 NOTE — TELEPHONE ENCOUNTER
I know this is late, but left VMM for patient to f/u on MRI scheduled for today. Voiced that IV sedation orders were in and to please call if he has questions or concerns.    Krupa Lee RN Care Coordinator

## 2017-10-05 NOTE — IP AVS SNAPSHOT
[address] [address]                                       After Visit Summary   10/5/2017    Jeff Lewis    MRN: [id number]           After Visit Summary Signature Page     I have received my discharge instructions, and my questions have been answered. I have discussed any challenges I see with this plan with the nurse or doctor.    ..........................................................................................................................................  Patient/Patient Representative Signature      ..........................................................................................................................................  Patient Representative Print Name and Relationship to Patient    ..................................................               ................................................  Date                                            Time    ..........................................................................................................................................  Reviewed by Signature/Title    ...................................................              ..............................................  Date                                                            Time

## 2017-10-05 NOTE — PATIENT INSTRUCTIONS
1. Please ask your primary physician to obtain a vitamin D level at the time of your next blood draw and let us know the results.    2. Continue Copaxone    3. Return to clinic in 6 months

## 2017-10-05 NOTE — MR AVS SNAPSHOT
After Visit Summary   10/5/2017    Jeff Lewis    MRN: [id number]           Patient Information     Date Of Birth          1960        Visit Information        Provider Department      10/5/2017 1:30 PM Federico Rodríguez MD [facility]        Care Instructions    1. Please ask your primary physician to obtain a vitamin D level at the time of your next blood draw and let us know the results.    2. Continue Copaxone    3. Return to clinic in 6 months          Follow-ups after your visit        Follow-up notes from your care team     Return in about 6 months (around 4/5/2018).      Your next 10 appointments already scheduled     Apr 26, 2018  9:00 AM CDT   (Arrive by 8:45 AM)   Return Multiple Sclerosis with Federico Rodríguez MD   [facility] Multiple Sclerosis ([facility])    [address] [place] [address] [phone]              Future tests that were ordered for you today     Open Standing Orders        Priority Remaining Interval Expires Ordered    Oxygen: Nasal cannula Routine [id number] PRN  10/5/2017    Notify Radiologists  Routine [id number] PRN  10/5/2017    Notify Provider Routine [id number] PRN  10/5/2017    Glucose monitor nursing POCT Routine [id number] PRN  10/5/2017            Who to contact     If you have questions or need follow up information about today's clinic visit or your schedule please contact [facility] MULTIPLE SCLEROSIS directly at [phone].  Normal or non-critical lab and imaging results will be communicated to you by MyChart, letter or phone within 4 business days after the clinic has received the results. If you do not hear from us within 7 days, please contact the clinic through MyChart or phone. If you have a critical or abnormal lab result, we will notify you by phone as soon as possible.  Submit refill requests through [employer] or call your pharmacy and they will forward the  "refill request to us. Please allow 3 business days for your refill to be completed.          Additional Information About Your Visit        [employer] Information     [employer] gives you secure access to your electronic health record. If you see a primary care provider, you can also send messages to your care team and make appointments. If you have questions, please call your primary care clinic.  If you do not have a primary care provider, please call [phone] and they will assist you.        Care EveryWhere ID     This is your Care EveryWhere ID. This could be used by other organizations to access your [place] medical records  [id number]        Your Vitals Were     Pulse Height BMI (Body Mass Index)             64 1.854 m (6' 1\") 33.64 kg/m2          Blood Pressure from Last 3 Encounters:   10/05/17 105/71   10/05/17 114/69   10/05/17 119/75    Weight from Last 3 Encounters:   10/05/17 115.7 kg (255 lb)   10/05/17 113.4 kg (250 lb)   03/27/17 123.3 kg (271 lb 14.4 oz)              Today, you had the following     No orders found for display       Primary Care Provider Office Phone # Fax #    Sukumar España [phone]       [facility] [address]  [facility] [address]        Equal Access to Services     [facility] PRATIK : Hadii aad ku hadasho Soomaali, waaxda luqadaha, qaybta kaalmada adeegyada, waxay laura taveras. So [facility] [phone].    ATENCIÓN: Si habla español, tiene a walker disposición servicios gratuitos de asistencia lingüística. Llame al [phone].    We comply with applicable federal civil rights laws and [place] laws. We do not discriminate on the basis of race, color, national origin, age, disability, sex, sexual orientation, or gender identity.            Thank you!     Thank you for choosing [facility] MULTIPLE SCLEROSIS  for your care. Our goal is always to provide you with excellent care. Hearing back from our patients is one way we " can continue to improve our services. Please take a few minutes to complete the written survey that you may receive in the mail after your visit with us. Thank you!             Your Updated Medication List - Protect others around you: Learn how to safely use, store and throw away your medicines at www.disposemymeds.org.          This list is accurate as of: 10/5/17  2:15 PM.  Always use your most recent med list.                   Brand Name Dispense Instructions for use Diagnosis    COPAXONE 40 MG/ML Sosy   Generic drug:  Glatiramer Acetate      Inject 40 mg Subcutaneous Every Mon, Wed, Fri Morning        IBUPROFEN PO      Take 600 mg by mouth every 6 hours as needed for moderate pain

## 2017-10-05 NOTE — PROCEDURES
[facility] [place]     Neuroradiology      Pre-Procedure Sedation Assessment :      10/5/2017 10:19 AM    Expected Level: Moderate Sedation    Indication: Sedation is required for the following type of Procedure: MR    Sedation and procedural consent: Risks, benefits and alternatives were discussed with Patient    PO Intake: Appropriately NPO for procedure    ASA Class: Class 2 - MILD SYSTEMIC DISEASE, NO ACUTE PROBLEMS, NO FUNCTIONAL LIMITATIONS.    Mallampati: Grade 1:  Soft palate, uvula, tonsillar pillars, and posterior pharyngeal wall visible    Lungs: Lungs Clear with good breath sounds bilaterally    Heart: Normal heart sounds and rate    Focused history and physical completed prior to procedure. I have reviewed the lab findings, diagnostic data, medications, and the plan for sedation. I have determined this patient to be an appropriate candidate for the planned sedation and procedure and have reassessed the patient IMMEDIATELY PRIOR to sedation and procedure.    Darcie Nicholson

## 2017-10-05 NOTE — NURSING NOTE
"Chief Complaint   Patient presents with     RECHECK     [facility] RETURN - MULTIPLE SCLEROSIS       Initial /71 (BP Location: Left arm, Patient Position: Sitting, Cuff Size: Adult Large)  Pulse 64  Ht 1.854 m (6' 1\")  Wt 115.7 kg (255 lb)  BMI 33.64 kg/m2 Estimated body mass index is 33.64 kg/(m^2) as calculated from the following:    Height as of this encounter: 1.854 m (6' 1\").    Weight as of this encounter: 115.7 kg (255 lb).  Medication Reconciliation: complete     Rhiannon Layne MA      "

## 2017-10-05 NOTE — LETTER
10/5/2017      RE: Jeff Lewis  [facility] [place] [address]       Referral source: Established patient     Chief complaint: Multiple sclerosis     History of the Present Illness:  Mr. Jeff Lewis is a 57-year-old man who returns to the Multiple Sclerosis Clinic today for regularly scheduled follow-up.      The patient's history is as per my previous notes.  I have been following him for several years for a diagnosis of multiple sclerosis.  He has unfortunately had an intractable neuropathic pain syndrome affecting the right jaw and neck  associated with a high right cervical spinal cord lesion that developed in 2006.  He had been stable as regards any evidence of active inflammatory demyelination for a number of years until March of this year, when he developed new left facial numbness in association with an enhancing plaque in the left joan.  Subsequent to that, we reinitiated disease-modifying therapy with glatiramer acetate.      He is tolerating the glatiramer acetate reasonably well, although he does have some injection site reactions.  The reduced sensation in the left face is improved and is now back to about 80% of his previous baseline.  He does not have any pain in this location, fortunately.  He denies any new changes in vision, balance, strength or sensation suggestive of a new relapse of multiple sclerosis since his last visit.     He continues to be dissatisfied with his pain clinic.  He was receiving ziconotide and hydromorphone intrathecally through a pain pump.  However, the patient tells me that the obdulio mechanism that was paying for his ziconotide has now run out and he is not able to afford the medication.  He tells me that his pump was refilled with saline but no other medication last week.  At present he is only using ibuprofen for pain.      Past Medical History:  1.  Coarctation of aorta.   2.  Bicuspid aortic valve.   3.  Status post open reduction and internal  fixation of right wrist fracture.   4.  Status post tendon repair in the right wrist.   5.  Status post bilateral carpal tunnel release.   6.  Status post cholecystectomy.      PHYSICAL EXAMINATION:   VITAL SIGNS:  Blood pressure 105/71; pulse 64; weight 115.7 kg; height 1.85 meters.   GENERAL:  Obese man who presents to the examination alone, awake and alert and in no acute distress.   NEUROLOGIC:   MENTAL STATUS:  Alert and oriented times four.   CRANIAL NERVES:  Visual fields are full to confrontation.  Extraocular movements are intact with no internuclear ophthalmoplegia.  Facial strength is normal.  Hearing is normal for conversational purposes.  Palate elevation and tongue protrusion are normal.   POWER:  Strength is normal (5/5) in the following muscles/groups bilaterally:  deltoids, biceps, triceps, finger extensors, first dorsal interosseous, hip flexors, hamstrings and anterior tibialis.  He is unable to extend at the right wrist due to previous injury.  Left wrist extensors are normal.   REFLEXES:  Reflexes are symmetric and within normal limits at biceps, triceps, brachioradialis, knees and ankles.   MOTOR/CEREBELLAR:  There are no tremors, myoclonus or other abnormal movements.  There is no appendicular ataxia on finger-to-nose testing and rapid alternating movements are normal in the extremities.  There is no pronator drift in the arms.   GAIT:  The patient is able to ambulate on a flat, level surface without difficulty.  He can walk on toes.  He has some difficulty walking on the heels on the right.  He is able to walk in tandem without much difficulty.      Investigations: I reviewed an MRI scan of the brain and orbits performed earlier today.  Left pontine T2 hyperintensity is less prominent than on previous image in March and is no longer enhancing with gadolinium contrast, consistent with the expected temporal resolution of a demyelinating plaque.  Again seen are several other periventricular foci  of T2 hyperintensity in the deep white matter of the cerebral hemispheres bilaterally, unchanged in comparison to earlier study.  There are no new lesions and no abnormal enhancement with gadolinium contrast.  Enhancing lesion in the posterior right orbit continues to appear less conspicuous than at earlier evaluations.      Assessment/plan:    1.  Relapsing-remitting multiple sclerosis  The patient did have a mild sensory relapse earlier this year and has now reinitiated disease-modifying therapy with glatiramer acetate.  He is tolerating this reasonably well.  I advised the patient that I would like for him to have a vitamin D level drawn, and he will do this through his primary care provider's office when he goes in for physical examination in the near future.  We will see him back in clinic in about 6 months.     2.  Chronic pain syndrome  To date, the patient has not had marked exacerbation of pain since withdrawal of intrathecal hydromorphone and ziconotide, although he tells me that apparently there may be some residual medication in the catheter.  He is not happy with his pain clinic and would prefer not to go back there.  He was seen by Dr. Michelle in the pain clinic here in February and was told that his pain pump cannot be managed through that clinic, but that if his pain were controlled without the intrathecal medication, he could transfer his care to our clinic.  They also discussed complimentary approaches including acupuncture, which the patient is planning to pursue at present.  For now, we will observe his response after discontinuation of the intrathecal medications and can arrange repeat evaluation in the pain clinic here, if desired.     3.  Right orbital mass  This was an incidentally detected finding that has never been symptomatic.  The fact that this is appearing less prominent over time may suggest an inflammatory issue such as orbital pseudotumor rather than a mass lesion.  The  likelihood of ominous pathology such as lymphoma seems very remote at present.  I told the patient that we should continue to keep an eye on this intermittently over time, but I do not think that he needs yearly imaging of this finding at present.      Federico Rodríguez MD   of Neurology  [facility]    Cc:  Sukumar España MD (PCP)  Eligio Michelle DO (Pain Management)  Patient

## 2017-10-05 NOTE — PROGRESS NOTES
Returned to unit following MRI. Received sedation. Alert and orient. No complaint of nausea or discomfort. Respiratory status stable. Vital signs within normal limits.

## 2017-10-05 NOTE — PROGRESS NOTES
Interventional Radiology Intra-procedural Nursing Note    Patient Name: Jeff Lewis  Medical Record Number: [id number]  Today's Date: 10/05/17    Start Time: 1030  End of procedure time: 1143  Procedure: MRI with Sedation  Report given to: 2A RN  Time pt departs:  1145        Patient transported to MRI from  by RN.  Consented for MRI with sedation by Dr. Nicholson. Patient complains of back spasms during MRI despite narcotic administration.      Fentanyl given:  300 mcg  Versed:  5 mg        Damien Venegas RN

## 2017-10-05 NOTE — DISCHARGE INSTRUCTIONS
[facility]  Going Home after Sedation      For 24 hours:    An adult should stay with you.    Relax and take it easy.    DO NOT make any important legal decisions.    DO NOT drive or operate machines at home or at work.    Resume your regular diet and drink plenty of fluids.    CALL THE PHYSICIAN IF:    You develop nausea or vomiting    You develop hives or a rash or any unexplained itching    ADDITIONAL INSTRUCTIONS:    [facility] INTERVENTIONAL RADIOLOGY DEPARTMENT        Procedure Physician:   Dr. Nicholson         Date of procedure:October 5, 2017          Telephone numbers:     [phone]      Monday-Friday 8:00 am to 4:30 pm                                              [phone]       After 4:30 pm Monday-Friday, Weekends & Holidays. Ask for the Interventional                                                                                                                    Radiologist on call. Someone is  available 24 hours/day        [facility] toll free number: [phone]  Monday-Friday 8:00 am to 4:30 pm

## 2017-10-05 NOTE — PROGRESS NOTES
Discharge instructions given and pt voiced understanding. No scripts needed from pharmacy. Up walking in room. Ate well for lunch. Urinating adequate amounts. Complaining of some discomfort but stating it is better then his normal baseline. Alert and orient following sedation. Adequate for discharge. Discharged to home with family.

## 2017-10-05 NOTE — IP AVS SNAPSHOT
MRN:[id number]                      After Visit Summary   10/5/2017    Jeff Lewis    MRN: [id number]           Visit Information        Department      10/5/2017  8:41 AM [address] [facility] [place]          Review of your medicines      UNREVIEWED medicines. Ask your doctor about these medicines        Dose / Directions    COPAXONE 40 MG/ML Sosy   Generic drug:  Glatiramer Acetate        Dose:  40 mg   Inject 40 mg Subcutaneous Every Mon, Wed, Fri Morning   Refills:  0       IBUPROFEN PO        Dose:  600 mg   Take 600 mg by mouth every 6 hours as needed for moderate pain   Refills:  0                Protect others around you: Learn how to safely use, store and throw away your medicines at www.disposemymeds.org.         Follow-ups after your visit        Your next 10 appointments already scheduled     Oct 05, 2017  1:30 PM CDT   (Arrive by 1:15 PM)   Return Multiple Sclerosis with Federico Rodríguez MD   [facility] Multiple Sclerosis ([facility])    [address] [place] [address] [phone]               Care Instructions        Further instructions from your care team       [facility]  Going Home after Sedation      For 24 hours:    An adult should stay with you.    Relax and take it easy.    DO NOT make any important legal decisions.    DO NOT drive or operate machines at home or at work.    Resume your regular diet and drink plenty of fluids.    CALL THE PHYSICIAN IF:    You develop nausea or vomiting    You develop hives or a rash or any unexplained itching    ADDITIONAL INSTRUCTIONS:    [facility] INTERVENTIONAL RADIOLOGY DEPARTMENT        Procedure Physician:   Dr. Nicholson         Date of procedure:October 5, 2017          Telephone numbers:     [phone]      Monday-Friday 8:00 am to 4:30 pm                                              [phone]       After 4:30 pm Monday-Friday, Weekends & Holidays. Ask for  "the Interventional                                                                                                                    Radiologist on call. Someone is  available 24 hours/day        [facility] toll free number: [phone]  Monday-Friday 8:00 am to 4:30 pm                                                                                                                                                                                                                        Additional Information About Your Visit        MyChart Information     [employer] gives you secure access to your electronic health record. If you see a primary care provider, you can also send messages to your care team and make appointments. If you have questions, please call your primary care clinic.  If you do not have a primary care provider, please call [phone] and they will assist you.        Care EveryWhere ID     This is your Care EveryWhere ID. This could be used by other organizations to access your [place] medical records  [id number]        Your Vitals Were     Blood Pressure Pulse Temperature Respirations Height Weight    122/71 65 97.7  F (36.5  C) (Oral) 16 1.854 m (6' 1\") 113.4 kg (250 lb)    Pulse Oximetry BMI (Body Mass Index)                98% 32.98 kg/m2           Primary Care Provider Office Phone # Fax #    Sukumar Espaañ [phone]      Equal Access to Services     [facility]ED[facility] CHRISTIE : Hadii felipe pendleton hadasho Sosabrina, waaxda luqadaha, qaybta kaalmada aderenetta, gerhard taveras. So [facility] [phone].    ATENCIÓN: Si habla español, tiene a walker disposición servicios gratuitos de asistencia lingüística. Aditi al [phone].    We comply with applicable federal civil rights laws and [place] laws. We do not discriminate on the basis of race, color, national origin, age, disability, sex, sexual orientation, or gender identity.            Thank you!     Thank you for choosing " [facility] for your care. Our goal is always to provide you with excellent care. Hearing back from our patients is one way we can continue to improve our services. Please take a few minutes to complete the written survey that you may receive in the mail after you visit with us. Thank you!             Medication List: This is a list of all your medications and when to take them. Check marks below indicate your daily home schedule. Keep this list as a reference.      Medications           Morning Afternoon Evening Bedtime As Needed    COPAXONE 40 MG/ML Sosy   Inject 40 mg Subcutaneous Every Mon, Wed, Fri Morning   Generic drug:  Glatiramer Acetate                                IBUPROFEN PO   Take 600 mg by mouth every 6 hours as needed for moderate pain

## 2017-10-10 NOTE — PROGRESS NOTES
Referral source: Established patient     Chief complaint: Multiple sclerosis     History of the Present Illness:  Mr. Jeff Lewis is a 57-year-old man who returns to the Multiple Sclerosis Clinic today for regularly scheduled follow-up.      The patient's history is as per my previous notes.  I have been following him for several years for a diagnosis of multiple sclerosis.  He has unfortunately had an intractable neuropathic pain syndrome affecting the right jaw and neck  associated with a high right cervical spinal cord lesion that developed in 2006.  He had been stable as regards any evidence of active inflammatory demyelination for a number of years until March of this year, when he developed new left facial numbness in association with an enhancing plaque in the left joan.  Subsequent to that, we reinitiated disease-modifying therapy with glatiramer acetate.      He is tolerating the glatiramer acetate reasonably well, although he does have some injection site reactions.  The reduced sensation in the left face is improved and is now back to about 80% of his previous baseline.  He does not have any pain in this location, fortunately.  He denies any new changes in vision, balance, strength or sensation suggestive of a new relapse of multiple sclerosis since his last visit.     He continues to be dissatisfied with his pain clinic.  He was receiving ziconotide and hydromorphone intrathecally through a pain pump.  However, the patient tells me that the obdulio mechanism that was paying for his ziconotide has now run out and he is not able to afford the medication.  He tells me that his pump was refilled with saline but no other medication last week.  At present he is only using ibuprofen for pain.      Past Medical History:  1.  Coarctation of aorta.   2.  Bicuspid aortic valve.   3.  Status post open reduction and internal fixation of right wrist fracture.   4.  Status post tendon repair in the right wrist.   5.   Status post bilateral carpal tunnel release.   6.  Status post cholecystectomy.      PHYSICAL EXAMINATION:   VITAL SIGNS:  Blood pressure 105/71; pulse 64; weight 115.7 kg; height 1.85 meters.   GENERAL:  Obese man who presents to the examination alone, awake and alert and in no acute distress.   NEUROLOGIC:   MENTAL STATUS:  Alert and oriented times four.   CRANIAL NERVES:  Visual fields are full to confrontation.  Extraocular movements are intact with no internuclear ophthalmoplegia.  Facial strength is normal.  Hearing is normal for conversational purposes.  Palate elevation and tongue protrusion are normal.   POWER:  Strength is normal (5/5) in the following muscles/groups bilaterally:  deltoids, biceps, triceps, finger extensors, first dorsal interosseous, hip flexors, hamstrings and anterior tibialis.  He is unable to extend at the right wrist due to previous injury.  Left wrist extensors are normal.   REFLEXES:  Reflexes are symmetric and within normal limits at biceps, triceps, brachioradialis, knees and ankles.   MOTOR/CEREBELLAR:  There are no tremors, myoclonus or other abnormal movements.  There is no appendicular ataxia on finger-to-nose testing and rapid alternating movements are normal in the extremities.  There is no pronator drift in the arms.   GAIT:  The patient is able to ambulate on a flat, level surface without difficulty.  He can walk on toes.  He has some difficulty walking on the heels on the right.  He is able to walk in tandem without much difficulty.      Investigations: I reviewed an MRI scan of the brain and orbits performed earlier today.  Left pontine T2 hyperintensity is less prominent than on previous image in March and is no longer enhancing with gadolinium contrast, consistent with the expected temporal resolution of a demyelinating plaque.  Again seen are several other periventricular foci of T2 hyperintensity in the deep white matter of the cerebral hemispheres bilaterally,  unchanged in comparison to earlier study.  There are no new lesions and no abnormal enhancement with gadolinium contrast.  Enhancing lesion in the posterior right orbit continues to appear less conspicuous than at earlier evaluations.      Assessment/plan:    1.  Relapsing-remitting multiple sclerosis  The patient did have a mild sensory relapse earlier this year and has now reinitiated disease-modifying therapy with glatiramer acetate.  He is tolerating this reasonably well.  I advised the patient that I would like for him to have a vitamin D level drawn, and he will do this through his primary care provider's office when he goes in for physical examination in the near future.  We will see him back in clinic in about 6 months.     2.  Chronic pain syndrome  To date, the patient has not had marked exacerbation of pain since withdrawal of intrathecal hydromorphone and ziconotide, although he tells me that apparently there may be some residual medication in the catheter.  He is not happy with his pain clinic and would prefer not to go back there.  He was seen by Dr. Michelle in the pain clinic here in February and was told that his pain pump cannot be managed through that clinic, but that if his pain were controlled without the intrathecal medication, he could transfer his care to our clinic.  They also discussed complimentary approaches including acupuncture, which the patient is planning to pursue at present.  For now, we will observe his response after discontinuation of the intrathecal medications and can arrange repeat evaluation in the pain clinic here, if desired.     3.  Right orbital mass  This was an incidentally detected finding that has never been symptomatic.  The fact that this is appearing less prominent over time may suggest an inflammatory issue such as orbital pseudotumor rather than a mass lesion.  The likelihood of ominous pathology such as lymphoma seems very remote at present.  I told the  patient that we should continue to keep an eye on this intermittently over time, but I do not think that he needs yearly imaging of this finding at present.         MEÑO DOBBS MD             D: 10/10/2017 16:26   T: 10/10/2017 17:18   MT: IGOR      Name:     KEYONNA JENKINS   MRN:      [id number]        Account:      [id number]   :      1960           Service Date: 10/05/2017      Document: [id number]

## 2018-01-17 ENCOUNTER — TELEPHONE (OUTPATIENT)
Dept: NEUROLOGY | Facility: CLINIC | Age: 58
End: 2018-01-17

## 2018-01-17 NOTE — TELEPHONE ENCOUNTER
Per Dr. Rodríguez:  I think the first thing to do is to communicate to him that we certainly did not advise him to stop Copaxone, and nor did we receive any notification of his difficulties with the drug.     Occurrence of an uncomfortable sensation in the chest after Copaxone is not unusual and these post-injection reactions are benign. The complicating factor is that he does have a significant cardiac history (aortic coarctation + bicuspid aortic valve) and discomfort in the chest could be confounding as regards concern for serious cardiac pathology.     His options at present include:     1) Restart Copaxone 40 mg three times weekly     2) Initiate Copaxone (or generic GA) 20 mg daily     3) Come in to discuss another DMT     4) Stop medication altogether: this is not an unreasonable decision as his relapses have been very infrequent, but does entail accepting that he could have a relapse that could have potentially been prevented by medication.     Please contact the patient to discuss--if he wants to come in before his scheduled appointment in April it would be best if I see him myself, so can work out a time if this is his preference. If he wanted to go back to the daily injections we could do this by mail, if desired.     Called patient to discuss, there was no answer. Left [facility] requesting a return call to discuss dmt

## 2018-01-17 NOTE — TELEPHONE ENCOUNTER
Received return call from Jeff. Discussed Dr. Rodríguez's input. The patient would like to go back on Copaxone 20 mg QD. Per , his insurance will cover generic with no PA. Start form placed in mail to patient's home with return envelope.

## 2018-01-17 NOTE — TELEPHONE ENCOUNTER
Per Yahaira, Therapy Mgmt Nurse: I spoke to David this morning, who said he has been off the Copaxone since his supply ran out in mid-December. He said that he saw his PCP in October and reported upper back and chest pain lasting a few minutes after each Copaxone injection, saying it feels like he's having a heart attack every time. He thought the PCP was going to reach out to Dr. Rodríguez about the adverse effects. He is aware that the chest pain is a possible SE. He does not remember receiving any calls from us in December re: Copaxone order, so he figured Dr. Rodríguez stopped the Copaxone. Of note, he received calls from us x 3 in early December to reorder the med. He does not want to take the Copaxone 40mg any longer, but mentioned he'd be willing to try a lower dose or another medication. Currently, he says that his MS is stable...still dealing with pain, and takes ibuprofen 800mg on PRN basis. I strongly encouraged him to contact your clinic if symptoms worsen since he's off medication, and I told him we would leave the Copaxone prescription active if he changes his mind and wants to set up an order. He did not want to place an order today.   Please let me know if I can do anything else.     Routed to Dr. Rodríguez for input.

## 2018-01-29 ENCOUNTER — MYC MEDICAL ADVICE (OUTPATIENT)
Dept: NEUROLOGY | Facility: CLINIC | Age: 58
End: 2018-01-29

## 2018-05-08 ENCOUNTER — DOCUMENTATION ONLY (OUTPATIENT)
Dept: NEUROLOGY | Facility: CLINIC | Age: 58
End: 2018-05-08

## 2018-11-14 ENCOUNTER — PATIENT OUTREACH (OUTPATIENT)
Dept: CARE COORDINATION | Facility: CLINIC | Age: 58
End: 2018-11-14

## 2018-11-15 ENCOUNTER — MEDICAL CORRESPONDENCE (OUTPATIENT)
Dept: HEALTH INFORMATION MANAGEMENT | Facility: CLINIC | Age: 58
End: 2018-11-15

## 2018-11-15 ENCOUNTER — TELEPHONE (OUTPATIENT)
Dept: NEUROLOGY | Facility: CLINIC | Age: 58
End: 2018-11-15

## 2018-11-15 ENCOUNTER — OFFICE VISIT (OUTPATIENT)
Dept: NEUROLOGY | Facility: CLINIC | Age: 58
End: 2018-11-15
Attending: PSYCHIATRY & NEUROLOGY
Payer: COMMERCIAL

## 2018-11-15 VITALS
WEIGHT: 257.7 LBS | DIASTOLIC BLOOD PRESSURE: 81 MMHG | HEART RATE: 76 BPM | HEIGHT: 73 IN | BODY MASS INDEX: 34.15 KG/M2 | SYSTOLIC BLOOD PRESSURE: 157 MMHG

## 2018-11-15 DIAGNOSIS — G35 MS (MULTIPLE SCLEROSIS) (H): Primary | ICD-10-CM

## 2018-11-15 DIAGNOSIS — M79.2 NEUROPATHIC PAIN: ICD-10-CM

## 2018-11-15 DIAGNOSIS — E55.9 VITAMIN D DEFICIENCY: ICD-10-CM

## 2018-11-15 DIAGNOSIS — H05.89 ORBITAL MASS: ICD-10-CM

## 2018-11-15 PROCEDURE — G0463 HOSPITAL OUTPT CLINIC VISIT: HCPCS | Mod: ZF

## 2018-11-15 ASSESSMENT — PAIN SCALES - GENERAL: PAINLEVEL: SEVERE PAIN (6)

## 2018-11-15 NOTE — MR AVS SNAPSHOT
After Visit Summary   11/15/2018    Jeff Lewis    MRN: [id number]           Patient Information     Date Of Birth          1960        Visit Information        Provider Department      11/15/2018 2:00 PM Federico Rodríguez MD [facility]        Today's Diagnoses     Neuropathic pain    -  1    MS (multiple sclerosis) (H)        Vitamin D deficiency          Care Instructions    1. We are referring you back to the [facility] for comprehensive evaluation and management    2. We filled out the forms to reinitiate daily glatiramer acetate today    3. Check blood test (vitamin D) today    4. Return to clinic in 6 months              Follow-ups after your visit        Additional Services     [facility] REFERRAL       Your provider has referred you to: [facility], located on the 4th Floor of the [facility]. Please call [phone] to make an appointment.     Clinic is located at:   [facility]   [address] [place] [address]      Please complete the following questions:    Procedure/Referral: Referral Only -  Comprehensive Evaluation and Management    What is your diagnosis for the patient's pain? Intractable neuropathic pain secondary to MS.    This patient was previously evaluated by Dr. Michelle in March 2017; at that time had pain pump managed through [facility]. Pump is no longer infusing (all medications weaned off); patient wants this explanted and to transfer his care to [facility].    What are your specific questions for the pain specialist? Further options for management of neuropathic pain (patient wants non-opiate therapy)    Are there any red flags that may impact the assessment or management of the patient? None    REGARDING OPIOID MEDICATIONS:  The discussion of opioids management, appropriateness of therapy, and dosing will be  discussed in patients being seen for evaluation.  The pain management clinics are not long-term prescribing clinics, with transition of prescribing of medications ultimately going back to the referring provider/PCP.  If prescribing is taken over at the pain clinic, it is in actively involved patients whom are appropriate for opioids, urine drug screening is completed, and long-term prescribing plan has been determined.  Therefore, we will not be automatically taking over prescribing at the patient's first visit.  Is this agreeable to you? agrees.    Please be aware that coverage of these services is subject to the terms and limitations of your health insurance plan.  Call member services at your health plan with any benefit or coverage questions.      Please bring the following with you to your appointment or have sent to the [facility]:    (1) Any X-Rays, CTs or MRIs which have been performed that are not in Epic.  Contact the facility where they were done to arrange for  prior to your scheduled appointment.  Any new CT, MRI or other procedures ordered by your specialist must be performed at a [facility] facility or coordinated by your clinic's referral office.    (2) List of current medications   (3) This referral request   (4) Any documents/labs given to you for this referral                  Your next 10 appointments already scheduled     Nov 19, 2018  8:15 AM CST   LAB with [facility] ([facility])    [address] [place] [id number] [phone]           Please do not eat 10-12 hours before your appointment if you are coming in fasting for labs on lipids, cholesterol, or glucose (sugar). This does not apply to pregnant women. Water, hot tea and black coffee (with nothing added) are okay. Do not drink other fluids, diet soda or chew gum.            May 16, 2019 11:30 AM CDT   (Arrive by 11:15 AM)   Return Multiple Sclerosis with Federico  "MD Anne   [facility] ([facility])    [address] [facility]  [address] [place] [id number] [phone]              Future tests that were ordered for you today     Open Future Orders        Priority Expected Expires Ordered    Vitamin D Deficiency Screening Routine 11/15/2018 12/31/2018 11/15/2018    [facility] REF[facility]AL Routine 11/15/2018 11/15/2019 11/15/2018            Who to contact     If you have questions or need follow up information about today's clinic visit or your schedule please contact [facility] directly at [phone].  Normal or non-critical lab and imaging results will be communicated to you by [employer], letter or phone within 4 business days after the clinic has received the results. If you do not hear from us within 7 days, please contact the clinic through [employer] or phone. If you have a critical or abnormal lab result, we will notify you by phone as soon as possible.  Submit refill requests through [employer] or call your pharmacy and they will forward the refill request to us. Please allow 3 business days for your refill to be completed.          Additional Information About Your Visit        [employer] Information     [employer] gives you secure access to your electronic health record. If you see a primary care provider, you can also send messages to your care team and make appointments. If you have questions, please call your primary care clinic.  If you do not have a primary care provider, please call [phone] and they will assist you.        Care EveryWhere ID     This is your Care EveryWhere ID. This could be used by other organizations to access your [place] medical records  [id number]        Your Vitals Were     Pulse Height BMI (Body Mass Index)             76 1.854 m (6' 1\") 34 kg/m2          Blood Pressure from Last 3 Encounters:   11/15/18 157/81   10/05/17 105/71   10/05/17 114/69 "    Weight from Last 3 Encounters:   11/15/18 116.9 kg (257 lb 11.2 oz)   10/05/17 115.7 kg (255 lb)   10/05/17 113.4 kg (250 lb)               Primary Care Provider Office Phone # Fax #    Sukumar España [phone]       [facility] [address]  [facility] [address]        Equal Access to Services     MEREDITH CHRISTIE : Hadii aad ku hadasho Soomaali, waaxda luqadaha, qaybta kaalmada adeegyada, waxay idiin hayaan adeeg kharareginaldo laraúl . So [facility] [phone].    ATENCIÓN: Si habla español, tiene a walker disposición servicios gratuitos de asistencia lingüística. [facility] [phone].    We comply with applicable federal civil rights laws and [place] laws. We do not discriminate on the basis of race, color, national origin, age, disability, sex, sexual orientation, or gender identity.            Thank you!     Thank you for choosing [facility]  for your care. Our goal is always to provide you with excellent care. Hearing back from our patients is one way we can continue to improve our services. Please take a few minutes to complete the written survey that you may receive in the mail after your visit with us. Thank you!             Your Updated Medication List - Protect others around you: Learn how to safely use, store and throw away your medicines at www.disposemymeds.org.          This list is accurate as of 11/15/18  2:37 PM.  Always use your most recent med list.                   Brand Name Dispense Instructions for use Diagnosis    COPAXONE 40 MG/ML Sosy   Generic drug:  Glatiramer Acetate      Inject 40 mg Subcutaneous Every Mon, Wed, Fri Morning        IBUPROFEN PO      Take 600 mg by mouth every 6 hours as needed for moderate pain

## 2018-11-15 NOTE — PATIENT INSTRUCTIONS
1. We are referring you back to the [facility] for comprehensive evaluation and management    2. We filled out the forms to reinitiate daily glatiramer acetate today    3. Check blood test (vitamin D) today    4. Return to clinic in 6 months

## 2018-11-15 NOTE — LETTER
11/15/2018      RE: Jeff Lewis  [address] [place] [address]     Date of service: November 15, 2018     Referral source: Established patient.      Chief complaint: Multiple sclerosis.      History of the Present Illness: Mr. Jeff Lewis is a 58-year-old man who returns to the [facility] today for scheduled follow-up visit.      The patient's history is as per my previous notes.  I have followed him for a number of years.  He initially presented with symptoms of demyelinating disease in February 2006 when he acutely developed pain in the right posterior aspect of the neck radiating into the jaw line.  He was found to have a lesion in the high cervical cord suspicious for a demyelinating plaque.  He was treated with platform injectable therapies including glatiramer acetate and interferon beta, but had stopped those medications by the time when I initially met him in 2014.  Somewhat unexpectedly, in March 2017, he had an episode of left facial numbness associated with a new enhancing lesion in the left brainstem and we decided to reinitiate treatment with glatiramer acetate at that time.  Unfortunately, the patient stopped this for somewhat unclear reasons in December.  He had contacted us about this in January and we advised him to reinitiate the medication, but he did not do so and further missed a follow-up appointment in May.  He reports today that he has not been on glatiramer acetate in approximately 1 year.        Fortunately, he denies any new episodic changes in vision, balance, strength or sensation suggestive of new relapse of multiple sclerosis since last seen here.      As has been the case throughout the entirety of the time I have known him, his primary complaint is chronic pain dating back to his initial episode in 2006.  He was previously being followed at the [facility] and was being treated with intrathecal hydromorphone and ziconotide.  These were  stopped as he lost the obdulio funding for the ziconotide and could not afford the medication.  He relates that he subsequently went into opioid withdrawal. He is very dissatisfied with the care that he received at the [facility] and is unwilling to return there.  The pain pump is still in situ but appears to be empty at present and he relates that an alarm has been sounding for the previous 3 weeks.  He is interested in re-establishing care with the [facility], although he indicates that he is not interested in further opiate therapy.  Currently, he is only using ibuprofen for pain.      Past Medical History:  1.  Coarctation of aorta.   2.  Bicuspid aortic valve.   3.  Status post open reduction and internal fixation of right wrist fracture.   4.  Status post tendon repair in the right wrist.   5.  Status post bilateral carpal tunnel release.   6.  Status post cholecystectomy.      PHYSICAL EXAMINATION:   VITAL SIGNS:  Blood pressure 157/81; pulse 76; weight 116.9 kg; height 1.85 meters.   GENERAL:  Obese man who presents to the examination accompanied by his wife, awake and alert and in no acute distress.   NEUROLOGIC:   MENTAL STATUS:  Alert and oriented times four.  CRANIAL NERVES:  Visual fields are full to confrontation.  Extraocular movements are intact with no internuclear ophthalmoplegia.  Facial strength is normal.  Hearing is normal for conversational purposes.  Palate elevation and tongue protrusion are normal.   POWER:  Strength is normal (5/5) in the following muscles/groups bilaterally:  deltoids, biceps, triceps, finger extensors, first dorsal interosseous, hip flexors, hamstrings and anterior tibialis.  Strength of the left wrist extensors is normal.  He is unable to extend the right wrist due to previous orthopedic injury.   REFLEXES:  Reflexes are symmetric and within normal limits at biceps, triceps, brachioradialis, knees and ankles.   MOTOR/CEREBELLAR:  There  are no tremors, myoclonus or other abnormal spontaneous movements.  Tone is grossly normal in the limbs.  There is no appendicular ataxia on finger-to-nose testing and rapid alternating movements are within normal limits in the extremities.  There is no pronator drift in the arms.   GAIT:  Gait is narrow-based and steady and the patient is able to ambulate on a flat, level surface without difficulty.  He is able to walk on heels, toes and in tandem.      Assessment/plan:    1.  Multiple sclerosis  I reiterated to the patient that given clinical and radiologic evidence of relapse of multiple sclerosis in March 2017, my primary recommendation would continue to be that he resume disease-modifying therapy.  He indicated today that he is most interested in resuming daily glatiramer acetate injections as he was experiencing unpleasant post-injection site reactions with the 3 times weekly formulation.  I did caution him that such reactions can occur with the daily medication as well, but at this time we are going to fill out the forms to reinitiate daily glatiramer acetate therapy as per his preference.      I will also check a vitamin D level today and will advise the patient on supplementation as needed to maintain his level within the goal range of 60-80 mcg/L.       We discussed MRI imaging.  As below, in addition to follow-up of his MS, he should have periodic imaging for follow-up of right intraorbital mass.  However, given the stability of the latter finding over the past several years, I do not think that he needs yearly imaging solely for this purpose.  As he has been off of disease-modifying treatment for a year, imaging from the standpoint of monitoring the radiological stability of MS would be unrevealing and accordingly, I told him that I would plan repeating MRI in 1 year after he has been re-established on therapy.     At present, we will plan on seeing him back in 6 months for a review.       2.  Chronic  right neck and jaw pain  As before, the patient has intractable neuropathic pain dating back to the time of his initial MS relapse in .  He mentions that he has lidocaine patches at home that he will use occasionally for pain relief, which are helpful, but currently are .  He also has been prescribed some type of topical therapy in the past that has been somewhat helpful for his discomfort as well.  I told him that I would be happy to provide him with refills of both of these medications.  As above, he is also interested in reestablishing care with the pain clinic here.  He was previously seen in spring 2017 by Dr. Michelle, and at that time he was informed that our pain clinic could not manage his intrathecal pain pump, but would be willing to accept him as a patient if he was subsequently weaned off of these medications.  This has now occurred and he would like to be re-evaluated in the pain clinic, and I provided him with a referral in this regard today.     3.  Right intraorbital mass  The patient had a finding of an apparent mass lesion in the right orbit detected incidentally on MRI a number of years ago.  Potential differential diagnoses at that time included sarcoidosis, lymphoma and orbital pseudotumor.  Subsequently, the appearance of the area of the abnormality on MRI has become less prominent and accordingly, I think that ominous etiologies such as lymphoma are markedly less likely. We will continue to keep an eye on this with serial neuroimaging in the future, however.     Federico Rodríguez MD   of Neurology  [facility]    Cc:  Sukumar España MD (PCP)  Patient

## 2018-11-15 NOTE — TELEPHONE ENCOUNTER
Prior Authorization Specialty Medication Request    Medication/Dose: Copaxone/Glatiramer Acetate 20mg  ICD code (if different than what is on RX):  Relapsing Remitting Multiple Sclerosis, G35  Previously Tried and Failed: n/a    Important Lab Values: n/a  Rationale: Re-initiation of disease modifying therapy for demyelinating disease, please approve.    Insurance Name: [employer]/Medicare  Insurance ID: [id number]  Insurance Phone Number: [phone]    Pharmacy Information (if different than what is on RX)  Name:  n/a  Phone:  n/a

## 2018-11-16 NOTE — TELEPHONE ENCOUNTER
Pt updated with relevant info.    Email sent to [facility] group at [facility] to fill glatiramer.

## 2018-11-16 NOTE — TELEPHONE ENCOUNTER
Start form faxed to [facility] hub via fax# [phone].    Start form copied x2 - 1 copy sent to scanning, 1 copy profiled at pharmacy for future fills.

## 2018-11-16 NOTE — TELEPHONE ENCOUNTER
Prior Authorization Not Needed per Insurance    Medication: Glatiramer acetate 20mg/ mL syringes  Insurance Company: [employer] - Phone [phone] Fax [phone]  Expected CoPay:    $0 with copay assistance ($200 without)  Pharmacy Filling the Rx: [facility] - [place], [place] - [address]   Pharmacy Notified:    Patient Notified:      ** Test claim = $200, no PA needed for generic glatiramer.    ** Pt can get Whisperject from pharmacy and likely won't need injection training (past use); pt can call hub to get copay assistance faster

## 2018-11-18 NOTE — PROGRESS NOTES
Date of service: November 15, 2018     Referral source: Established patient.      Chief complaint: Multiple sclerosis.      History of the Present Illness: Mr. Jeff Lewis is a 58-year-old man who returns to the [facility]clerosis [facility] today for scheduled follow-up visit.      The patient's history is as per my previous notes.  I have followed him for a number of years.  He initially presented with symptoms of demyelinating disease in February 2006 when he acutely developed pain in the right posterior aspect of the neck radiating into the jaw line.  He was found to have a lesion in the high cervical cord suspicious for a demyelinating plaque.  He was treated with platform injectable therapies including glatiramer acetate and interferon beta, but had stopped those medications by the time when I initially met him in 2014.  Somewhat unexpectedly, in March 2017, he had an episode of left facial numbness associated with a new enhancing lesion in the left brainstem and we decided to reinitiate treatment with glatiramer acetate at that time.  Unfortunately, the patient stopped this for somewhat unclear reasons in December.  He had contacted us about this in January and we advised him to reinitiate the medication, but he did not do so and further missed a follow-up appointment in May.  He reports today that he has not been on glatiramer acetate in approximately 1 year.        Fortunately, he denies any new episodic changes in vision, balance, strength or sensation suggestive of new relapse of multiple sclerosis since last seen here.      As has been the case throughout the entirety of the time I have known him, his primary complaint is chronic pain dating back to his initial episode in 2006.  He was previously being followed at the [facility] and was being treated with intrathecal hydromorphone and ziconotide.  These were stopped as he lost the obdulio funding for the ziconotide and could not afford the  medication.  He relates that he subsequently went into opioid withdrawal. He is very dissatisfied with the care that he received at the [facility] and is unwilling to return there.  The pain pump is still in situ but appears to be empty at present and he relates that an alarm has been sounding for the previous 3 weeks.  He is interested in re-establishing care with the [facility], although he indicates that he is not interested in further opiate therapy.  Currently, he is only using ibuprofen for pain.      Past Medical History:  1.  Coarctation of aorta.   2.  Bicuspid aortic valve.   3.  Status post open reduction and internal fixation of right wrist fracture.   4.  Status post tendon repair in the right wrist.   5.  Status post bilateral carpal tunnel release.   6.  Status post cholecystectomy.      PHYSICAL EXAMINATION:   VITAL SIGNS:  Blood pressure 157/81; pulse 76; weight 116.9 kg; height 1.85 meters.   GENERAL:  Obese man who presents to the examination accompanied by his wife, awake and alert and in no acute distress.   NEUROLOGIC:   MENTAL STATUS:  Alert and oriented times four.  CRANIAL NERVES:  Visual fields are full to confrontation.  Extraocular movements are intact with no internuclear ophthalmoplegia.  Facial strength is normal.  Hearing is normal for conversational purposes.  Palate elevation and tongue protrusion are normal.   POWER:  Strength is normal (5/5) in the following muscles/groups bilaterally:  deltoids, biceps, triceps, finger extensors, first dorsal interosseous, hip flexors, hamstrings and anterior tibialis.  Strength of the left wrist extensors is normal.  He is unable to extend the right wrist due to previous orthopedic injury.   REFLEXES:  Reflexes are symmetric and within normal limits at biceps, triceps, brachioradialis, knees and ankles.   MOTOR/CEREBELLAR:  There are no tremors, myoclonus or other abnormal spontaneous movements.  Tone is grossly  normal in the limbs.  There is no appendicular ataxia on finger-to-nose testing and rapid alternating movements are within normal limits in the extremities.  There is no pronator drift in the arms.   GAIT:  Gait is narrow-based and steady and the patient is able to ambulate on a flat, level surface without difficulty.  He is able to walk on heels, toes and in tandem.      Assessment/plan:    1.  Multiple sclerosis  I reiterated to the patient that given clinical and radiologic evidence of relapse of multiple sclerosis in March 2017, my primary recommendation would continue to be that he resume disease-modifying therapy.  He indicated today that he is most interested in resuming daily glatiramer acetate injections as he was experiencing unpleasant post-injection site reactions with the 3 times weekly formulation.  I did caution him that such reactions can occur with the daily medication as well, but at this time we are going to fill out the forms to reinitiate daily glatiramer acetate therapy as per his preference.      I will also check a vitamin D level today and will advise the patient on supplementation as needed to maintain his level within the goal range of 60-80 mcg/L.       We discussed MRI imaging.  As below, in addition to follow-up of his MS, he should have periodic imaging for follow-up of right intraorbital mass.  However, given the stability of the latter finding over the past several years, I do not think that he needs yearly imaging solely for this purpose.  As he has been off of disease-modifying treatment for a year, imaging from the standpoint of monitoring the radiological stability of MS would be unrevealing and accordingly, I told him that I would plan repeating MRI in 1 year after he has been re-established on therapy.     At present, we will plan on seeing him back in 6 months for a review.     2.  Chronic right neck and jaw pain  As before, the patient has intractable neuropathic pain dating  back to the time of his initial MS relapse in .  He mentions that he has lidocaine patches at home that he will use occasionally for pain relief, which are helpful, but currently are .  He also has been prescribed some type of topical therapy in the past that has been somewhat helpful for his discomfort as well.  I told him that I would be happy to provide him with refills of both of these medications.  As above, he is also interested in reestablishing care with the pain clinic here.  He was previously seen in spring 2017 by Dr. Michelle, and at that time he was informed that our pain clinic could not manage his intrathecal pain pump, but would be willing to accept him as a patient if he was subsequently weaned off of these medications.  This has now occurred and he would like to be re-evaluated in the pain clinic, and I provided him with a referral in this regard today.     3.  Right intraorbital mass  The patient had a finding of an apparent mass lesion in the right orbit detected incidentally on MRI a number of years ago.  Potential differential diagnoses at that time included sarcoidosis, lymphoma and orbital pseudotumor.  Subsequently, the appearance of the area of the abnormality on MRI has become less prominent and accordingly, I think that ominous etiologies such as lymphoma are markedly less likely.  We will continue to keep an eye on this with serial neuroimaging in the future, however.         MEÑO DOBBS MD             D: 2018   T: 2018   MT: IGOR      Name:     KEYONNA JENKINS   MRN:      [id number]        Account:      [id number]   :      1960           Service Date: 11/15/2018      Document: [id number]

## 2018-11-19 DIAGNOSIS — E55.9 VITAMIN D DEFICIENCY: ICD-10-CM

## 2018-11-19 DIAGNOSIS — G35 MS (MULTIPLE SCLEROSIS) (H): ICD-10-CM

## 2018-11-20 LAB — DEPRECATED CALCIDIOL+CALCIFEROL SERPL-MC: 17 UG/L (ref 20–75)

## 2018-11-28 NOTE — TELEPHONE ENCOUNTER
Copay assistance sent to pharmacy from [facility] on 11/26. Pt has order set up for 11/30 delivery from [facility].

## 2018-12-15 ENCOUNTER — MYC MEDICAL ADVICE (OUTPATIENT)
Dept: NEUROLOGY | Facility: CLINIC | Age: 58
End: 2018-12-15

## 2019-01-10 ENCOUNTER — OFFICE VISIT (OUTPATIENT)
Dept: ANESTHESIOLOGY | Facility: CLINIC | Age: 59
End: 2019-01-10
Payer: COMMERCIAL

## 2019-01-10 VITALS — DIASTOLIC BLOOD PRESSURE: 78 MMHG | HEART RATE: 91 BPM | OXYGEN SATURATION: 96 % | SYSTOLIC BLOOD PRESSURE: 127 MMHG

## 2019-01-10 DIAGNOSIS — M54.2 CERVICALGIA: Primary | ICD-10-CM

## 2019-01-10 ASSESSMENT — ANXIETY QUESTIONNAIRES
6. BECOMING EASILY ANNOYED OR IRRITABLE: NOT AT ALL
7. FEELING AFRAID AS IF SOMETHING AWFUL MIGHT HAPPEN: NOT AT ALL
GAD7 TOTAL SCORE: 0
2. NOT BEING ABLE TO STOP OR CONTROL WORRYING: NOT AT ALL
3. WORRYING TOO MUCH ABOUT DIFFERENT THINGS: NOT AT ALL
GAD7 TOTAL SCORE: 0
GAD7 TOTAL SCORE: 0
4. TROUBLE RELAXING: NOT AT ALL
1. FEELING NERVOUS, ANXIOUS, OR ON EDGE: NOT AT ALL
5. BEING SO RESTLESS THAT IT IS HARD TO SIT STILL: NOT AT ALL

## 2019-01-10 ASSESSMENT — PAIN SCALES - GENERAL: PAINLEVEL: SEVERE PAIN (6)

## 2019-01-10 ASSESSMENT — ENCOUNTER SYMPTOMS: NECK PAIN: 1

## 2019-01-10 NOTE — PROGRESS NOTES
[facility] for Comprehensive Chronic Pain Management : Consultation Note    Patient: Jeff Lewis Age: 58 year old   MRN: [id number] Referred by:  Patria     Date of Visit: January 10, 2019    Reason for consultation:    Jeff Lewis is a 58 year old male who is seen in consultation today at the request of his provider, Dr. España,  for a comprehensive evaluation and management of pain.  Primary Care Provider is Sukumar España  Opiate pain medications are being prescribed by - n/a.    Chief complaints:  Neck pain.    History of Present illness:     Jeff Lewis is a 58 year old male with pain history as described below:    Chronic pain throughout the entire right side of his neck from the lower part of his skull to the base of his neck. The pain does not radiate anywhere. This has been ongoing for >10 yrs. The skin is hypersensitive and can be painful to touch. This pain is constant and described as sharp, stabbing and burning. It is worse when he sweats. Nothing improves this pain. He is managing the neck pain adequately and does not desire any medications or treatment for it at this time.    He has an ITP in place. The initial pump was replaced in Aug 2015. It was implanted and managed by [facility] in the past. About 1.5 yrs ago, he lost obdulio money for the ziconotide (which only helped in the beginning) and was then unable to afford it. The pump has not been infusing meds (ziconotide, hydromorphone) since Sep 2017. The alarm has been going off for the past 7 wks. The pump has been irritating his right lower rib since losing weight. He is unable to like on his right side. He wants the ITP explanted, but does not want to return to [facility] as he was not pleased with the care he received there in the past.    He had a cervical SCS trial at [facility] which did not help.    [facility]    Seen in 2017 once by  "Dr. Michelle.    The patient has a medical history significant for coarctation of aorta, bicuspid aortic valve, ORIF and tendon repair R wrist, bilat carpal tunnel release, cervical SCS trial.    Current tx:  -ibuprofen 200 mg 4 tabs, about once daily in the evening, some days he does not take it  -lidocaine patch, but does not help that much, and tends to fall off  -topical cm (compound cm?)    Trials of therapies including     PT: No  TENs Unit: Yes, unable to tolerate  Manual Medicine/Chiropractic: Yes  Previous medication treatments included:  -baclofen, wt gain  -tizanidine, didn't help  -amitriptyline  -gabapentin, wt gain  -topamax, didn't help  -duloxetine, sexual side effects, also ineffective  -fentanyl patch, dilaudid    Previous pain interventions:  -cervical ROSA, only helped for 1-2 days, [facility]  -SCS trial in C spine ~2010 by [facility] Prescription Monitoring Program:   Reviewed. No concerns    Review of Systems:  Review of Systems   Musculoskeletal: Positive for neck pain.   All other systems reviewed and are negative.      Past Medical History:  Past Medical History:   Diagnosis Date     Arthritis      Back pain      Coarctation of aorta      CTS (carpal tunnel syndrome)      Dyslipidemia      Falls     past hx of falls     MS (multiple sclerosis) (H)      Other chronic pain      Pain 8/15/2015     Pneumonia     \"yearly\" per pt\"     Polyuria      Preiser disease (H)      Right bundle branch block      Vitamin D deficiency        Past Surgical History:  Past Surgical History:   Procedure Laterality Date     bilateral carapl tunnel       CHOLECYSTECTOMY  2010     ENT SURGERY  1963     GALLBLADDER SURGERY       INSERT INTRATHECAL PAIN PUMP       REPLACE INTRATHECAL PAIN PUMP N/A 8/14/2015    Procedure: REPLACE INTRATHECAL PAIN PUMP;  Surgeon: Sukumar Salinas MD;  Location: [facility] OR     WRIST SURGERY         Medications:  Current Outpatient Medications   Medication Sig " Dispense Refill     Glatiramer Acetate (COPAXONE) 40 MG/ML SOSY Inject 40 mg Subcutaneous Every Mon, Wed, Fri Morning       IBUPROFEN PO Take 600 mg by mouth every 6 hours as needed for moderate pain         Analgesic Medications:   Medications related to Pain Management (From now, onward)    None           Allergies:       Allergies   Allergen Reactions     Nsaids      Other reaction(s): Angioedema     Naprosyn [Naproxen] Swelling     Nuts Swelling     Tree nuts       Social History:    Social History     Socioeconomic History     Marital status:      Spouse name: Not on file     Number of children: Not on file     Years of education: Not on file     Highest education level: Not on file   Social Needs     Financial resource strain: Not on file     Food insecurity - worry: Not on file     Food insecurity - inability: Not on file     Transportation needs - medical: Not on file     Transportation needs - non-medical: Not on file   Occupational History     Not on file   Tobacco Use     Smoking status: Current Every Day Smoker     Packs/day: 0.50     Types: Cigarettes     Smokeless tobacco: Never Used   Substance and Sexual Activity     Alcohol use: Yes     Comment: Very little alcohol. 1 beer per month.      Drug use: No     Sexual activity: Not on file   Other Topics Concern     Parent/sibling w/ CABG, MI or angioplasty before 65F 55M? Yes   Social History Narrative     Not on file     Social History     Social History Narrative     Not on file         Family history:  Family History   Problem Relation Age of Onset     Neurologic Disorder Other         Paternal uncle-multiple sclerosis     Cancer Mother      Cancer Father          Physical Exam:  Vitals:    01/10/19 1204   BP: 127/78   Pulse: 91   SpO2: 96%       General: Awake, no apparent distress.   Eyes: Sclerae are anicteric, EOMI   Neck: no masses   Lungs: unlabored  Abdomen: ITP in R abdominal region  Musculoskeletal: No significant cervical ROM  limitations. Hyperesthesia and allodynia throughout R side of neck.  Neurologic exam: Sensation to light touch intact throughout BUE.   Psychiatric; Normal affect.   Skin: Warm and Dry.       LABORATORY VALUES:   Recent Labs   Lab Test 03/26/17  0019 09/29/16  1700    136   POTASSIUM 4.1 4.0   CHLORIDE 104 103   CO2 22 25   ANIONGAP 11 7   * 158*   BUN 20 20   CR 0.85 0.91   MICHELET 9.2 9.4       CBC RESULTS:   Recent Labs   Lab Test 03/26/17 0019   WBC 10.8   RBC 5.03   HGB 16.1   HCT 45.5   MCV 91   MCH 32.0   MCHC 35.4   RDW 13.3          Most Recent 3 INR's:  Recent Labs   Lab Test 03/26/17 0019   INR 1.12         Diagnostic tests:           MRI of cervical/lumbar/thoracic spine showing:     ASSESSMENT/PLAN:                             ASSESSMENT:    Jeff Lewis is a 58 year old male with PMH of coarctation of aorta, bicuspid aortic valve, ORIF and tendon repair R wrist, bilat carpal tunnel release and cervical SCS trial presenting with chronic pain throughout the right side of his neck 2/2 to MS. Hyperesthesia and allodynia was noted in the right neck region on exam. This pain though is manageable for him and he is not requesting any treatment for it. His only objective today is to get the ITP explanted which has not been infusing meds since Sep 2017. Since losing weight, it has been irritating his right lower rib and for the last seven weeks, the alarm has been going off.      PLAN:    -We will see if Dr. Rees in neurosurgery would be agreeable to explant the ITP. The patient has not desire to return to [facility].  -He may follow up with us as needed.      Assessment will be ongoing with changes in treatment as indicated.  Benefits/risks/alternatives to treatment have been reviewed and the patient has been instructed to contact this office if they have any questions or concerns.  This plan of care has been discussed with the patient and the patient is in agreement.      Karlos Alford DO  Pain Medicine Fellow    I saw and examined the patient with the fellow and agree with the findings and the plan of care as documented in the fellow's note.   Venkata Almanza IV, MD    TOTAL TIME: I spent 40 minutes including greater than 50% face-to-face time counseling him about his diagnosis and treatment options.   Answers for HPI/ROS submitted by the patient on 1/10/2019   General Symptoms: No  Skin Symptoms: No  HENT Symptoms: No  EYE SYMPTOMS: No  HEART SYMPTOMS: No  LUNG SYMPTOMS: No  INTESTINAL SYMPTOMS: No  URINARY SYMPTOMS: No  REPRODUCTIVE SYMPTOMS: No  SKELETAL SYMPTOMS: No  BLOOD SYMPTOMS: No  NERVOUS SYSTEM SYMPTOMS: No  MENTAL HEALTH SYMPTOMS: No  LEANNA 7 TOTAL SCORE: 0

## 2019-01-10 NOTE — NURSING NOTE
AVS given and reviewed with pt.  Pt verbalized understanding and declined any questions.     Cristine Person, RN, BSN

## 2019-01-10 NOTE — PATIENT INSTRUCTIONS
1. We will reach out to neurosurgery to discuss removal of your pain pump.  We will follow up with you once we have more information.        Follow up: As needed         To speak with a nurse, schedule/reschedule/cancel a clinic appointment, or request a medication refill call: [phone]     You can also reach us by [employer]: [email or web address]    For refills, please call on Monday, 1 week before your medication runs out. The doctors are not always in clinic, so this gives us time to get your prescriptions ready.  Please let us know the name of the medication you are requesting a refill of.

## 2019-01-10 NOTE — LETTER
1/10/2019       RE: Jeff Lewis  [facility] [place] [address]     Dear Colleague,    Thank you for referring your patient, Jeff Lewis, to the [facility] COMPRE[facility]ENSIVE PAIN MANAGEMENT at [facility]. Please see a copy of my visit note below.    [facility] for Comprehensive Chronic Pain Management : Consultation Note    Patient: Jeff Lewis Age: 58 year old   MRN: [id number] Referred by:  Patria     Date of Visit: January 10, 2019    Reason for consultation:    Jeff Lewis is a 58 year old male who is seen in consultation today at the request of his provider, Dr. España,  for a comprehensive evaluation and management of pain.  Primary Care Provider is Sukumar España.  Opiate pain medications are being prescribed by - n/a.    Chief complaints:  Neck pain.    History of Present illness:     Jeff Lewis is a 58 year old male with pain history as described below:    Chronic pain throughout the entire right side of his neck from the lower part of his skull to the base of his neck. The pain does not radiate anywhere. This has been ongoing for >10 yrs. The skin is hypersensitive and can be painful to touch. This pain is constant and described as sharp, stabbing and burning. It is worse when he sweats. Nothing improves this pain. He is managing the neck pain adequately and does not desire any medications or treatment for it at this time.    He has an ITP in place. The initial pump was replaced in Aug 2015. It was implanted and managed by [facility] in the past. About 1.5 yrs ago, he lost obdulio money for the ziconotide (which only helped in the beginning) and was then unable to afford it. The pump has not been infusing meds (ziconotide, hydromorphone) since Sep 2017. The alarm has been going off for the past 7 wks. The pump has been irritating his right lower rib since losing weight. He  "is unable to like on his right side. He wants the ITP explanted, but does not want to return to [facility] as he was not pleased with the care he received there in the past.    He had a cervical SCS trial at [facility] which did not help.    [facility].    Seen in 2017 once by Dr. Michelle.    The patient has a medical history significant for coarctation of aorta, bicuspid aortic valve, ORIF and tendon repair R wrist, bilat carpal tunnel release, cervical SCS trial.    Current tx:  -ibuprofen 200 mg 4 tabs, about once daily in the evening, some days he does not take it  -lidocaine patch, but does not help that much, and tends to fall off  -topical cm (compound cm?)    Trials of therapies including     PT: No  TENs Unit: Yes, unable to tolerate  Manual Medicine/Chiropractic: Yes  Previous medication treatments included:  -baclofen, wt gain  -tizanidine, didn't help  -amitriptyline  -gabapentin, wt gain  -topamax, didn't help  -duloxetine, sexual side effects, also ineffective  -fentanyl patch, dilaudid    Previous pain interventions:  -cervical ROSA, only helped for 1-2 days, [facility]  -SCS trial in C spine ~2010 by [facility] Prescription Monitoring Program:   Reviewed. No concerns    Review of Systems:  Review of Systems   Musculoskeletal: Positive for neck pain.   All other systems reviewed and are negative.      Past Medical History:  Past Medical History:   Diagnosis Date     Arthritis      Back pain      Coarctation of aorta      CTS (carpal tunnel syndrome)      Dyslipidemia      Falls     past hx of falls     MS (multiple sclerosis) (H)      Other chronic pain      Pain 8/15/2015     Pneumonia     \"yearly\" per pt\"     Polyuria      Preiser disease (H)      Right bundle branch block      Vitamin D deficiency        Past Surgical History:  Past Surgical History:   Procedure Laterality Date     bilateral carapl tunnel       CHOLECYSTECTOMY  2010 "     ENT SURGERY  1963     GALLBLADDER SURGERY       INSERT INTRATHECAL PAIN PUMP       REPLACE INTRATHECAL PAIN PUMP N/A 8/14/2015    Procedure: REPLACE INTRATHECAL PAIN PUMP;  Surgeon: Sukumar Salinas MD;  Location: [facility] OR     WRIST SURGERY         Medications:  Current Outpatient Medications   Medication Sig Dispense Refill     Glatiramer Acetate (COPAXONE) 40 MG/ML SOSY Inject 40 mg Subcutaneous Every Mon, Wed, Fri Morning       IBUPROFEN PO Take 600 mg by mouth every 6 hours as needed for moderate pain         Analgesic Medications:   Medications related to Pain Management (From now, onward)    None           Allergies:       Allergies   Allergen Reactions     Nsaids      Other reaction(s): Angioedema     Naprosyn [Naproxen] Swelling     Nuts Swelling     Tree nuts       Social History:    Social History     Socioeconomic History     Marital status:      Spouse name: Not on file     Number of children: Not on file     Years of education: Not on file     Highest education level: Not on file   Social Needs     Financial resource strain: Not on file     Food insecurity - worry: Not on file     Food insecurity - inability: Not on file     Transportation needs - medical: Not on file     Transportation needs - non-medical: Not on file   Occupational History     Not on file   Tobacco Use     Smoking status: Current Every Day Smoker     Packs/day: 0.50     Types: Cigarettes     Smokeless tobacco: Never Used   Substance and Sexual Activity     Alcohol use: Yes     Comment: Very little alcohol. 1 beer per month.      Drug use: No     Sexual activity: Not on file   Other Topics Concern     Parent/sibling w/ CABG, MI or angioplasty before 65F 55M? Yes   Social History Narrative     Not on file     Social History     Social History Narrative     Not on file         Family history:  Family History   Problem Relation Age of Onset     Neurologic Disorder Other         Paternal uncle-multiple sclerosis     Cancer Mother       Cancer Father          Physical Exam:  Vitals:    01/10/19 1204   BP: 127/78   Pulse: 91   SpO2: 96%       General: Awake, no apparent distress.   Eyes: Sclerae are anicteric, EOMI   Neck: no masses   Lungs: unlabored  Abdomen: ITP in R abdominal region  Musculoskeletal: No significant cervical ROM limitations. Hyperesthesia and allodynia throughout R side of neck.  Neurologic exam: Sensation to light touch intact throughout BUE.   Psychiatric; Normal affect.   Skin: Warm and Dry.       LABORATORY VALUES:   Recent Labs   Lab Test 03/26/17  0019 09/29/16  1700    136   POTASSIUM 4.1 4.0   CHLORIDE 104 103   CO2 22 25   ANIONGAP 11 7   * 158*   BUN 20 20   CR 0.85 0.91   MICHELET 9.2 9.4       CBC RESULTS:   Recent Labs   Lab Test 03/26/17  0019   WBC 10.8   RBC 5.03   HGB 16.1   HCT 45.5   MCV 91   MCH 32.0   MCHC 35.4   RDW 13.3          Most Recent 3 INR's:  Recent Labs   Lab Test 03/26/17  0019   INR 1.12         Diagnostic tests:           MRI of cervical/lumbar/thoracic spine showing:     ASSESSMENT/PLAN:                             ASSESSMENT:    Jeff Lewis is a 58 year old male with PMH of coarctation of aorta, bicuspid aortic valve, ORIF and tendon repair R wrist, bilat carpal tunnel release and cervical SCS trial presenting with chronic pain throughout the right side of his neck 2/2 to MS. Hyperesthesia and allodynia was noted in the right neck region on exam. This pain though is manageable for him and he is not requesting any treatment for it. His only objective today is to get the ITP explanted which has not been infusing meds since Sep 2017. Since losing weight, it has been irritating his right lower rib and for the last seven weeks, the alarm has been going off.      PLAN:    -We will see if Dr. Rees in neurosurgery would be agreeable to explant the ITP. The patient has not desire to return to [facility].  -He may follow up with us as needed.      Assessment will  be ongoing with changes in treatment as indicated.  Benefits/risks/alternatives to treatment have been reviewed and the patient has been instructed to contact this office if they have any questions or concerns.  This plan of care has been discussed with the patient and the patient is in agreement.     Karlos Alford DO  Pain Medicine Fellow    I saw and examined the patient with the fellow and agree with the findings and the plan of care as documented in the fellow's note.     Venkata Almanza IV, MD    TOTAL TIME: I spent 40 minutes including greater than 50% face-to-face time counseling him about his diagnosis and treatment options.

## 2019-01-10 NOTE — NURSING NOTE
Pt has a pain pump that was implanted by [facility] 8/14/15.   Pt states that he is unable to find anyone to manage it, [facility] included.     Pt has been seen in clinic previously but Dr. Michelle on 2/15/17.    Pump is currently empty, alarm is going off.   Pt is here to discuss the possibility of having the pump removed.     Lorrie Son LPN

## 2019-01-11 ENCOUNTER — TELEPHONE (OUTPATIENT)
Dept: ANESTHESIOLOGY | Facility: CLINIC | Age: 59
End: 2019-01-11

## 2019-01-11 ASSESSMENT — ANXIETY QUESTIONNAIRES: GAD7 TOTAL SCORE: 0

## 2019-01-14 NOTE — TELEPHONE ENCOUNTER
Pt updated that Dr. Rees will evaluate pt in clinic for pump removal.  Pt advised that he will need to have [facility] records released to [facility].  Pt advised that neurosurgery will assist with scheduling and let him know specific records needed.     Cristine Person RN, BSN

## 2019-01-22 ENCOUNTER — TELEPHONE (OUTPATIENT)
Dept: ANESTHESIOLOGY | Facility: CLINIC | Age: 59
End: 2019-01-22

## 2019-01-22 NOTE — TELEPHONE ENCOUNTER
[facility] Call Center    Phone Message    May a detailed message be left on voicemail: no    Reason for Call: Other: Patient is caling and says that he has not heard anything from the clinic in regards to getting his pain pump removed. He says that he was not aware that he needed to have medical records released from [facility], he will call them to start that process. Patient wants to know who is going to remove his pain pump. Please call the patient back as soon as possible as it has been 11 days since last (note) encounter and patient is waiting.      Action Taken: Message routed to:  [facility] Clinic

## 2019-01-22 NOTE — TELEPHONE ENCOUNTER
The call center called with the patient on the phone and I informed them that we did get his message. But we referred him to neurosurgery about getting his pump removed so he will want to follow up with Neurosurgery about getting an appointment to see if they will remove his pump.  Patient stated verbal understanding and had no further questions or concerns.    Veda Evans, SAV

## 2019-01-28 ENCOUNTER — APPOINTMENT (OUTPATIENT)
Dept: LAB | Facility: CLINIC | Age: 59
End: 2019-01-28
Payer: COMMERCIAL

## 2019-01-28 ENCOUNTER — ANCILLARY PROCEDURE (OUTPATIENT)
Dept: GENERAL RADIOLOGY | Facility: CLINIC | Age: 59
End: 2019-01-28
Payer: COMMERCIAL

## 2019-01-28 ENCOUNTER — OFFICE VISIT (OUTPATIENT)
Dept: NEUROSURGERY | Facility: CLINIC | Age: 59
End: 2019-01-28
Payer: COMMERCIAL

## 2019-01-28 VITALS
WEIGHT: 260.9 LBS | SYSTOLIC BLOOD PRESSURE: 156 MMHG | RESPIRATION RATE: 18 BRPM | HEIGHT: 73 IN | OXYGEN SATURATION: 98 % | BODY MASS INDEX: 34.58 KG/M2 | TEMPERATURE: 97.7 F | HEART RATE: 72 BPM | DIASTOLIC BLOOD PRESSURE: 80 MMHG

## 2019-01-28 DIAGNOSIS — Z98.890 S/P INSERTION OF INTRATHECAL PUMP: ICD-10-CM

## 2019-01-28 DIAGNOSIS — G89.4 CHRONIC PAIN SYNDROME: ICD-10-CM

## 2019-01-28 DIAGNOSIS — Z97.8 PRESENCE OF INTRATHECAL PUMP: ICD-10-CM

## 2019-01-28 DIAGNOSIS — Z97.8 PRESENCE OF INTRATHECAL BACLOFEN PUMP: ICD-10-CM

## 2019-01-28 DIAGNOSIS — G37.9 CNS DEMYELINATING DISEASE (H): ICD-10-CM

## 2019-01-28 DIAGNOSIS — Z01.818 PREOPERATIVE EVALUATION TO RULE OUT SURGICAL CONTRAINDICATION: Primary | ICD-10-CM

## 2019-01-28 DIAGNOSIS — Z97.8 PRESENCE OF INTRATHECAL BACLOFEN PUMP: Primary | ICD-10-CM

## 2019-01-28 PROBLEM — F11.20 OPIOID TYPE DEPENDENCE, CONTINUOUS (H): Status: ACTIVE | Noted: 2019-01-28

## 2019-01-28 PROBLEM — F32.A DEPRESSION: Status: ACTIVE | Noted: 2019-01-28

## 2019-01-28 PROBLEM — G60.9 IDIOPATHIC POLYNEUROPATHY: Status: ACTIVE | Noted: 2017-03-14

## 2019-01-28 PROBLEM — M19.049 CMC ARTHRITIS: Status: ACTIVE | Noted: 2017-03-06

## 2019-01-28 LAB
ALBUMIN UR-MCNC: NEGATIVE MG/DL
ANION GAP SERPL CALCULATED.3IONS-SCNC: 6 MMOL/L (ref 3–14)
APPEARANCE UR: CLEAR
BILIRUB UR QL STRIP: NEGATIVE
BUN SERPL-MCNC: 17 MG/DL (ref 7–30)
CALCIUM SERPL-MCNC: 8.8 MG/DL (ref 8.5–10.1)
CHLORIDE SERPL-SCNC: 104 MMOL/L (ref 94–109)
CO2 SERPL-SCNC: 26 MMOL/L (ref 20–32)
COLOR UR AUTO: YELLOW
CREAT SERPL-MCNC: 0.85 MG/DL (ref 0.66–1.25)
ERYTHROCYTE [DISTWIDTH] IN BLOOD BY AUTOMATED COUNT: 12.6 % (ref 10–15)
GFR SERPL CREATININE-BSD FRML MDRD: >90 ML/MIN/{1.73_M2}
GLUCOSE SERPL-MCNC: 195 MG/DL (ref 70–99)
GLUCOSE UR STRIP-MCNC: >499 MG/DL
HCT VFR BLD AUTO: 47.7 % (ref 40–53)
HGB BLD-MCNC: 16.1 G/DL (ref 13.3–17.7)
HGB UR QL STRIP: NEGATIVE
KETONES UR STRIP-MCNC: NEGATIVE MG/DL
LEUKOCYTE ESTERASE UR QL STRIP: NEGATIVE
MCH RBC QN AUTO: 30.5 PG (ref 26.5–33)
MCHC RBC AUTO-ENTMCNC: 33.8 G/DL (ref 31.5–36.5)
MCV RBC AUTO: 90 FL (ref 78–100)
MUCOUS THREADS #/AREA URNS LPF: PRESENT /LPF
NITRATE UR QL: NEGATIVE
PH UR STRIP: 5 PH (ref 5–7)
PLATELET # BLD AUTO: 200 10E9/L (ref 150–450)
POTASSIUM SERPL-SCNC: 4.1 MMOL/L (ref 3.4–5.3)
RBC # BLD AUTO: 5.28 10E12/L (ref 4.4–5.9)
RBC #/AREA URNS AUTO: 1 /HPF (ref 0–2)
SODIUM SERPL-SCNC: 136 MMOL/L (ref 133–144)
SOURCE: ABNORMAL
SP GR UR STRIP: 1.02 (ref 1–1.03)
SQUAMOUS #/AREA URNS AUTO: 2 /HPF (ref 0–1)
UROBILINOGEN UR STRIP-MCNC: 0 MG/DL (ref 0–2)
WBC # BLD AUTO: 9.1 10E9/L (ref 4–11)
WBC #/AREA URNS AUTO: 1 /HPF (ref 0–5)

## 2019-01-28 ASSESSMENT — MIFFLIN-ST. JEOR: SCORE: 2047.31

## 2019-01-28 ASSESSMENT — PAIN SCALES - GENERAL: PAINLEVEL: EXTREME PAIN (8)

## 2019-01-28 NOTE — NURSING NOTE
Pre-op Teaching            Pre-op folder with specific written instructions    Removal of intrathecal pump  Date/time: to be determined, but possibly 2/6/19 if OR available  Dr. Rees    Discussed pre-op routine and requirements to include:  surgical procedure, post-op recovery and expectations, need for H&P (completed by Dr. Rees), labs and xrays today, NPO prior to OR, pre-op antibacterial showers, pain control and importance of follow-up visits. Pt will discharge ibuprofen today.  Surgery scheduling will coordinate OR time/date and update patient as appropriate.  [facility] will call with more instructions 24-48 hour pre-op.   Ample time was provided for patient questions and in-depth discussion of topics of heightened interest.  A four ounce bottle of antibacterial soap solution was given to patient as well as specific instructions for use.  Patient/family verbalized understanding of instructions.  Approximately 20 minutes spent with patient and family discussing and reviewing.

## 2019-01-28 NOTE — PROGRESS NOTES
HISTORY AND PHYSICAL EXAM    Chief Complaint   Patient presents with     New Patient     CONSULT - REMOVAL OF INTRATHECAL DRUG DELIVERY SYSTEM     Medication Reconciliation     COMPLETE       HISTORY OF PRESENT ILLNESS  We saw . Jeff Lewis and his wife for the first time today in [facility] for discussion of an intrathecal drug delivery system explant.  In summary, he is a 58-year-old male with a 10+ year history of chronic right-sided neck pain located around the lower part of his skull to the base of his neck.  The region is hypersensitive to touch.  The pain is constant and is described as sharp and burning, though he is managing the pain and does not desire further medication or treatment.  He has an intrathecal drug delivery system that was last replaced on 8/14/2015 by [facility].  The surgery was done at [facility] and he was hospitalized for a week due to problems related to the surgery.  Currently, he is unable to afford ziconotide and it has not been infusing medication since 09/2017.  The alarm has been ringing for 2.5 months.  The alarm is a low reservoir alarm.  Even though the pump has normal saline in it, he has not been able to find a provider that was willing to fill it with normal saline.  Since losing weight, the pump has been irritating his right lower rib which makes him unable to lie on his right side. Thus, he is looking to have the pain pump explanted.  Of note, he had a cervical spinal cord stimulator done at [facility] which did not help his pain.  He is also being followed by [facility] and has reinitiated daily glatiramer acetate therapy as of November 2018.     Currently, Mr. Lewis seems to be doing well.  He notes that he does not want any more medication due to previous opiate withdrawals.  His pump has saline and he has been unable to find someone who will refill his pump.  Upon questioning, patient states that there was no Dacron  "pouch placed during his pump replacement.  He also notes the old catheter from the first pump placement was left in place during the replacement in 08/2015.  He was told that it could not be removed.  He also states that he does not wish to return to [facility] to have anything done, including removal of his intrathecal drug delivery system because he was not satisfied with the care he received.  He takes ibuprofen as needed, with the latest use being yesterday.  He is not on anticoagulation/antiplatelet therapy.  He states that he is searching for alternative therapies but no one is willing to see him due to his intrathecal drug delivery system.      Past Medical History:   Diagnosis Date     Arthritis      Back pain      Coarctation of aorta      CTS (carpal tunnel syndrome)      Dyslipidemia      Falls     past hx of falls     MS (multiple sclerosis) (H)      Other chronic pain      Pain 8/15/2015     Pneumonia     \"yearly\" per pt\"     Polyuria      Preiser disease (H)      Right bundle branch block      Vitamin D deficiency        Past Surgical History:   Procedure Laterality Date     bilateral carapl tunnel       CHOLECYSTECTOMY  2010     ENT SURGERY  1963     GALLBLADDER SURGERY       INSERT INTRATHECAL PAIN PUMP       REPLACE INTRATHECAL PAIN PUMP N/A 8/14/2015    Procedure: REPLACE INTRATHECAL PAIN PUMP;  Surgeon: Sukumar Salinas MD;  Location: [facility] OR     WRIST SURGERY         Family History   Problem Relation Age of Onset     Neurologic Disorder Other         Paternal uncle-multiple sclerosis     Cancer Mother      Cancer Father        Social History     Socioeconomic History     Marital status:      Spouse name: Not on file     Number of children: Not on file     Years of education: Not on file     Highest education level: Not on file   Social Needs     Financial resource strain: Not on file     Food insecurity - worry: Not on file     Food insecurity - inability: Not on file     Transportation needs - " medical: Not on file     Transportation needs - non-medical: Not on file   Occupational History     Not on file   Tobacco Use     Smoking status: Current Every Day Smoker     Packs/day: 0.50     Types: Cigarettes     Smokeless tobacco: Never Used   Substance and Sexual Activity     Alcohol use: Yes     Comment: Very little alcohol. 1 beer per month.      Drug use: No     Sexual activity: Not on file   Other Topics Concern     Parent/sibling w/ CABG, MI or angioplasty before 65F 55M? Yes   Social History Narrative     Not on file          Allergies   Allergen Reactions     Nsaids      Other reaction(s): Angioedema     Naprosyn [Naproxen] Swelling     Nuts Swelling     Tree nuts       Current Outpatient Medications   Medication     Glatiramer Acetate (COPAXONE) 40 MG/ML SOSY     IBUPROFEN PO     No current facility-administered medications for this visit.        REVIEW OF SYSTEMS:  General: Negative for chills/sweats/fever, difficulty sleeping, headache, recent fatigue, or weight gain/loss.  Eyes: Negative for blurred vision, crossed eyes, double vision, recent eye infections, vision flashes, or vision halos.  Ears/Nose/Mouth/Throat: Negative for bleeding gums, difficulty swallowing, earache, ear discharge, hearing loss, hoarseness, nosebleeds, tinnitus, or sinus problems.  Respiratory:Negative for chronic cough, coughing blood, night sweats, shortness of breath, Tuberculosis, or wheezing.  Cardiovascular: Negative for chest pain, dyspnea at night, heart murmur, palpitations, pacemaker, pacemaker, poor circulation, swollen legs/feet, or varicose veins.  Gastrointestinal: Negative for melena, hematochezia, chronic diarrhea, heartburn, Hepatitis A/B/C, increasing constipation, Liver Disease, nausea, or vomiting.   Genitourinary: Negative for Urinary retention, genital discharge, urinary incontinence, prostate problems, urgency, or UTI.   Neurological: Negative for syncope, headaches, numbness of arms/legs, tingling in  "hands/arms/legs, memory problems, or seizures.  Psychological: Negative for anxiety, depression, panic attacks, or restlessness.  Skin: Negative for chronic skin itching, color changes in hand/feet when cold, poor scarring, non-healing ulcers, skin rashes/hives, unusual moles.  Musculoskeletal: Negative for arthritis, joint swelling in hands/wrists/hips/knees/joints, muscle tenderness in arms/legs, or osteoporosis.  Endocrine: Negative for excessive thirst/hunger, intolerance for warm rooms, loss of libido, multiple broken bones, rapid weight gain/loss, galactorrhea, or thyroid issues.  Hematologic/Lymphatic: Negative for easy skin bruising, significant fatigue, prolonged bleeding, tender glands/lymph nodes.  Allergies: Negative for asthma or hay fever.      PHYSICAL EXAM  /80 (BP Location: Left arm, Patient Position: Sitting, Cuff Size: Adult Large)   Pulse 72   Temp 97.7  F (36.5  C) (Oral)   Resp 18   Ht 1.854 m (6' 1\")   Wt 118.3 kg (260 lb 14.4 oz)   SpO2 98%   BMI 34.42 kg/m      General: Awake, alert, oriented. Well nourished, well developed, he is not in any acute distress.  HEENT: Head normocephalic, atraumatic. No carotid bruit. Neck supple. Good range of motion. No palpable thyroid mass.  Heart: Regular rhythm and rate. No murmurs.  Lungs: Decreased breath sounds but no rhonchi, rales or wheeze.  Abdomen: Soft, non-tender, non-distended. No hepatosplenomegaly.  Extremity: Warm with no clubbing or cyanosis. No lower extremity edema. Post-surgical scar on the right forearm and bilateral median nerve decompression scars.    Neurological  Awake, alert and oriented to date, time, place and person. Speech fluent.   Pupils equal, round, reactive to light.  Extraocular movement intact.  Visual Fields are full on confrontation.  Hearing is grossly normal to finger rub.   Facial sensation intact.  Face symmetric.  Tongue midline.  Uvula elevates equally.    Motor: full strength throughout.  Sensation: " intact to light touch and pinpoint. He is hypersensitive to touch in the posterior neck that wraps to the front.  Deep tendon reflexes: 3+ throughout. Negative for clonus. Negative for Mcfarlane's sign. No dysmetria.      IMAGING  Lumbar spine MRI from 2016 - you can barely visualize the intrathecal catheter but it does appear that he has two catheters.    X-ray 8/14/2015 - appears to be intraoperative fluoroscopy.  Two catheters seen.  Old one at higher lumbar entry and new one, which appears to be the Ascenda model, entering at lower lumbar region.      ASSESSMENT  58-year-old male with a history of right-sided neck pain ongoing for 10+ years.   S/p intrathecal drug delivery system, replacement by [facility] on 8/14/2015  No longer using the system.  Pain from the device.    Patient presents for consideration of removing his current intrathecal drug delivery system.  There are several reasons for this.  First, he is unable to afford the ziconotide medication.  Second, he is no longer using the intrathecal drug delivery system for any therapy, even for other medications.  Third, he is having pain at his ribs from the pump itself.  And forth, he states that he is unable to pursue other therapy options as no provider would see him given that he has an intrathecal drug delivery system.  These are reasonable causes for the removal of the system.    He does not appear to be having any issues related to medications or cerebrospinal fluid related issues.  The pump only has normal saline in it, although it is now empty, based on the pump alarm going off.  It is difficult to understand but he has not been able to find any provider who would refill his pump with normal saline.  It is even more difficult to understand that no providers would consider him for other therapies and would not even see him due to the presence of the system.    One concern is the fact that he has a retained catheter in his back from his  last revision surgery.  It appears to be the older catheter type.  It was reportedly just tied off.  There are no new images or recent images to see what is implanted in his body currently.    We did discuss the surgical procedure for the removal of the system.  I told him that I would try to remove all the components if possible, including the catheter that was left behind at the last surgery.  I'm not exactly sure why the previous surgeon left it behind.  He was told that it was stuck but based on the images, the anchor still remains so I'm not sure if that was what happened.    Risks of intrathecal pain pump explant are primarily bleeding and infection.  Ongoing cerebrospinal fluid leak and spinal headaches were also discussed.  Possibility of not being able to remove all the components, especially the catheter, was also discussed.  Given that he has only normal saline, there are no concerns for drug withdrawal.  Recovery time was discussed to range from a few days up to a week at most.  Surgery will be performed under general anesthesia, per patient's request, and it would be a same day surgery.    To plan for the surgery, x-ray evaluation of his thoracic and lumbar spine, as well as his abdomen, will be done.      PLAN  1.  Obtain lumbar spine, thoracic spine, and abdominal x-rays to evaluate his intrathecal drug delivery system.  2.  Obtain preoperative labs.  3.  Will plan for surgery, as soon as possible - Removal of intrathecal drug delivery system - removal of intrathecal catheter from the spine and removal of the pump from the right abdomen      IKatja, am serving as a scribe to document services personally performed by Jeff Rees MD, PhD, based upon my observations and the provider's statements to me. All documentation has been reviewed and edited by the aforementioned doctor prior to being entered into the official medical record.    I, Jeff Rees, attest that above named  individual is acting in scribe capacity, has observed my performance of the services and has documented them in accordance with my direction. The documentation recorded by the scribe accurately reflects the service I personally performed and the decisions made by me. The document was also partially recorded by me and the entire document was edited by me as well.       65 minutes were spent face to face with the patient of which more than 50% of the time was spent counseling and discussing the above issues regarding diagnosis, treatment and surgical plan/options, and steps for further evaluation.      ADDENDUM  X-ray thoracic and lumbar spine, X-ray abdomen on 1/28/2019  He has an old type catheter that is anchored at his lumbar spine which enters the spinal canal at the T12/L1 interlaminar space.  This catheter then goes all the way to his abdomen pump site.  He has a new type catheter - Ascenda - that is also anchored at his lumbar spine which enters the spinal canal at the L2/3 interlaminar space.    Based on this information, it is unclear if removal of the old catheter was ever attempted.  Will plan on removal of all the components.

## 2019-01-28 NOTE — NURSING NOTE
Chief Complaint   Patient presents with     New Patient     CONSULT      Medication Reconciliation     COMPLETE     Destiney Marin

## 2019-01-28 NOTE — LETTER
1/28/2019       RE: Jeff Lewis  [facility] [place] [address]     Dear Colleague,    Thank you for referring your patient, Jeff Lewis, to the [facility] NEUROSURGERY at [facility]. Please see a copy of my visit note below.    HISTORY AND PHYSICAL EXAM    Chief Complaint   Patient presents with     New Patient     CONSULT - REMOVAL OF INTRATHECAL DRUG DELIVERY SYSTEM     Medication Reconciliation     COMPLETE       HISTORY OF PRESENT ILLNESS  We saw Mr. Jeff Lewis and his wife for the first time today in Neurosurgery Clinic for discussion of an intrathecal drug delivery system explant.  In summary, he is a 58-year-old male with a 10+ year history of chronic right-sided neck pain located around the lower part of his skull to the base of his neck.  The region is hypersensitive to touch.  The pain is constant and is described as sharp and burning, though he is managing the pain and does not desire further medication or treatment.  He has an intrathecal drug delivery system that was last replaced on 8/14/2015 by [facility].  The surgery was done at [facility] and he was hospitalized for a week due to problems related to the surgery.  Currently, he is unable to afford ziconotide and it has not been infusing medication since 09/2017.  The alarm has been ringing for 2.5 months.  The alarm is a low reservoir alarm.  Even though the pump has normal saline in it, he has not been able to find a provider that was willing to fill it with normal saline.  Since losing weight, the pump has been irritating his right lower rib which makes him unable to lie on his right side. Thus, he is looking to have the pain pump explanted.  Of note, he had a cervical spinal cord stimulator done at [facility] which did not help his pain.  He is also being followed by the [facility] and has reinitiated daily glatiramer acetate therapy as of November  "2018.     Currently, Mr. Lewis seems to be doing well.  He notes that he does not want any more medication due to previous opiate withdrawals.  His pump has saline and he has been unable to find someone who will refill his pump.  Upon questioning, patient states that there was no Dacron pouch placed during his pump replacement.  He also notes the old catheter from the first pump placement was left in place during the replacement in 08/2015.  He was told that it could not be removed.  He also states that he does not wish to return to [facility] to have anything done, including removal of his intrathecal drug delivery system because he was not satisfied with the care he received.  He takes ibuprofen as needed, with the latest use being yesterday.  He is not on anticoagulation/antiplatelet therapy.  He states that he is searching for alternative therapies but no one is willing to see him due to his intrathecal drug delivery system.      Past Medical History:   Diagnosis Date     Arthritis      Back pain      Coarctation of aorta      CTS (carpal tunnel syndrome)      Dyslipidemia      Falls     past hx of falls     MS (multiple sclerosis) (H)      Other chronic pain      Pain 8/15/2015     Pneumonia     \"yearly\" per pt\"     Polyuria      Preiser disease (H)      Right bundle branch block      Vitamin D deficiency        Past Surgical History:   Procedure Laterality Date     bilateral carapl tunnel       CHOLECYSTECTOMY  2010     ENT SURGERY  1963     GALLBLADDER SURGERY       INSERT INTRATHECAL PAIN PUMP       REPLACE INTRATHECAL PAIN PUMP N/A 8/14/2015    Procedure: REPLACE INTRATHECAL PAIN PUMP;  Surgeon: Sukumar Salinas MD;  Location: [facility] OR     WRIST SURGERY         Family History   Problem Relation Age of Onset     Neurologic Disorder Other         Paternal uncle-multiple sclerosis     Cancer Mother      Cancer Father        Social History     Socioeconomic History     Marital status:      Spouse name: Not on " file     Number of children: Not on file     Years of education: Not on file     Highest education level: Not on file   Social Needs     Financial resource strain: Not on file     Food insecurity - worry: Not on file     Food insecurity - inability: Not on file     Transportation needs - medical: Not on file     Transportation needs - non-medical: Not on file   Occupational History     Not on file   Tobacco Use     Smoking status: Current Every Day Smoker     Packs/day: 0.50     Types: Cigarettes     Smokeless tobacco: Never Used   Substance and Sexual Activity     Alcohol use: Yes     Comment: Very little alcohol. 1 beer per month.      Drug use: No     Sexual activity: Not on file   Other Topics Concern     Parent/sibling w/ CABG, MI or angioplasty before 65F 55M? Yes   Social History Narrative     Not on file          Allergies   Allergen Reactions     Nsaids      Other reaction(s): Angioedema     Naprosyn [Naproxen] Swelling     Nuts Swelling     Tree nuts       Current Outpatient Medications   Medication     Glatiramer Acetate (COPAXONE) 40 MG/ML SOSY     IBUPROFEN PO     No current facility-administered medications for this visit.        REVIEW OF SYSTEMS:  General: Negative for chills/sweats/fever, difficulty sleeping, headache, recent fatigue, or weight gain/loss.  Eyes: Negative for blurred vision, crossed eyes, double vision, recent eye infections, vision flashes, or vision halos.  Ears/Nose/Mouth/Throat: Negative for bleeding gums, difficulty swallowing, earache, ear discharge, hearing loss, hoarseness, nosebleeds, tinnitus, or sinus problems.  Respiratory:Negative for chronic cough, coughing blood, night sweats, shortness of breath, Tuberculosis, or wheezing.  Cardiovascular: Negative for chest pain, dyspnea at night, heart murmur, palpitations, pacemaker, pacemaker, poor circulation, swollen legs/feet, or varicose veins.  Gastrointestinal: Negative for melena, hematochezia, chronic diarrhea, heartburn,  "Hepatitis A/B/C, increasing constipation, Liver Disease, nausea, or vomiting.   Genitourinary: Negative for Urinary retention, genital discharge, urinary incontinence, prostate problems, urgency, or UTI.   Neurological: Negative for syncope, headaches, numbness of arms/legs, tingling in hands/arms/legs, memory problems, or seizures.  Psychological: Negative for anxiety, depression, panic attacks, or restlessness.  Skin: Negative for chronic skin itching, color changes in hand/feet when cold, poor scarring, non-healing ulcers, skin rashes/hives, unusual moles.  Musculoskeletal: Negative for arthritis, joint swelling in hands/wrists/hips/knees/joints, muscle tenderness in arms/legs, or osteoporosis.  Endocrine: Negative for excessive thirst/hunger, intolerance for warm rooms, loss of libido, multiple broken bones, rapid weight gain/loss, galactorrhea, or thyroid issues.  Hematologic/Lymphatic: Negative for easy skin bruising, significant fatigue, prolonged bleeding, tender glands/lymph nodes.  Allergies: Negative for asthma or hay fever.    PHYSICAL EXAM  /80 (BP Location: Left arm, Patient Position: Sitting, Cuff Size: Adult Large)   Pulse 72   Temp 97.7  F (36.5  C) (Oral)   Resp 18   Ht 1.854 m (6' 1\")   Wt 118.3 kg (260 lb 14.4 oz)   SpO2 98%   BMI 34.42 kg/m       General: Awake, alert, oriented. Well nourished, well developed, he is not in any acute distress.  HEENT: Head normocephalic, atraumatic. No carotid bruit. Neck supple. Good range of motion. No palpable thyroid mass.  Heart: Regular rhythm and rate. No murmurs.  Lungs: Decreased breath sounds but no rhonchi, rales or wheeze.  Abdomen: Soft, non-tender, non-distended. No hepatosplenomegaly.  Extremity: Warm with no clubbing or cyanosis. No lower extremity edema. Post-surgical scar on the right forearm and bilateral median nerve decompression scars.    Neurological  Awake, alert and oriented to date, time, place and person. Speech fluent. "   Pupils equal, round, reactive to light.  Extraocular movement intact.  Visual Fields are full on confrontation.  Hearing is grossly normal to finger rub.   Facial sensation intact.  Face symmetric.  Tongue midline.  Uvula elevates equally.    Motor: full strength throughout.  Sensation: intact to light touch and pinpoint. He is hypersensitive to touch in the posterior neck that wraps to the front.  Deep tendon reflexes: 3+ throughout. Negative for clonus. Negative for Mcfarlane's sign. No dysmetria.    IMAGING  Lumbar spine MRI from 2016 - you can barely visualize the intrathecal catheter but it does appear that he has two catheters.    X-ray 8/14/2015 - appears to be intraoperative fluoroscopy.  Two catheters seen.  Old one at higher lumbar entry and new one, which appears to be the Ascenda model, entering at lower lumbar region.    ASSESSMENT  58-year-old male with a history of right-sided neck pain ongoing for 10+ years.   S/p intrathecal drug delivery system, replacement by [facility] on 8/14/2015  No longer using the system.  Pain from the device.    Patient presents for consideration of removing his current intrathecal drug delivery system.  There are several reasons for this.  First, he is unable to afford the ziconotide medication.  Second, he is no longer using the intrathecal drug delivery system for any therapy, even for other medications.  Third, he is having pain at his ribs from the pump itself.  And forth, he states that he is unable to pursue other therapy options as no provider would see him given that he has an intrathecal drug delivery system.  These are reasonable causes for the removal of the system.    He does not appear to be having any issues related to medications or cerebrospinal fluid related issues.  The pump only has normal saline in it, although it is now empty, based on the pump alarm going off.  It is difficult to understand but he has not been able to find any provider  who would refill his pump with normal saline.  It is even more difficult to understand that no providers would consider him for other therapies and would not even see him due to the presence of the system.    One concern is the fact that he has a retained catheter in his back from his last revision surgery.  It appears to be the older catheter type.  It was reportedly just tied off.  There are no new images or recent images to see what is implanted in his body currently.    We did discuss the surgical procedure for the removal of the system.  I told him that I would try to remove all the components if possible, including the catheter that was left behind at the last surgery.  I'm not exactly sure why the previous surgeon left it behind.  He was told that it was stuck but based on the images, the anchor still remains so I'm not sure if that was what happened.    Risks of intrathecal pain pump explant are primarily bleeding and infection.  Ongoing cerebrospinal fluid leak and spinal headaches were also discussed.  Possibility of not being able to remove all the components, especially the catheter, was also discussed.  Given that he has only normal saline, there are no concerns for drug withdrawal.  Recovery time was discussed to range from a few days up to a week at most.  Surgery will be performed under general anesthesia, per patient's request, and it would be a same day surgery.    To plan for the surgery, x-ray evaluation of his thoracic and lumbar spine, as well as his abdomen, will be done.    PLAN  1.  Obtain lumbar spine, thoracic spine, and abdominal x-rays to evaluate his intrathecal drug delivery system.  2.  Obtain preoperative labs.  3.  Will plan for surgery, as soon as possible - Removal of intrathecal drug delivery system - removal of intrathecal catheter from the spine and removal of the pump from the right abdomen    Katja QIU, am serving as a scribe to document services personally performed  by Jeff Rees MD, PhD, based upon my observations and the provider's statements to me. All documentation has been reviewed and edited by the aforementioned doctor prior to being entered into the official medical record.    I, Jeff Rees, attest that above named individual is acting in scribe capacity, has observed my performance of the services and has documented them in accordance with my direction. The documentation recorded by the scribe accurately reflects the service I personally performed and the decisions made by me. The document was also partially recorded by me and the entire document was edited by me as well.     65 minutes were spent face to face with the patient of which more than 50% of the time was spent counseling and discussing the above issues regarding diagnosis, treatment and surgical plan/options, and steps for further evaluation.    ADDENDUM  X-ray thoracic and lumbar spine, X-ray abdomen on 1/28/2019  He has an old type catheter that is anchored at his lumbar spine which enters the spinal canal at the T12/L1 interlaminar space.  This catheter then goes all the way to his abdomen pump site.  He has a new type catheter - Ascenda - that is also anchored at his lumbar spine which enters the spinal canal at the L2/3 interlaminar space.    Based on this information, it is unclear if removal of the old catheter was ever attempted.  Will plan on removal of all the components.    Again, thank you for allowing me to participate in the care of your patient.      Sincerely,    Jeff Rees MD

## 2019-02-05 ENCOUNTER — ANESTHESIA EVENT (OUTPATIENT)
Dept: SURGERY | Facility: CLINIC | Age: 59
End: 2019-02-05
Payer: COMMERCIAL

## 2019-02-05 NOTE — ANESTHESIA PREPROCEDURE EVALUATION
"Anesthesia Pre-Procedure Evaluation    Patient: Jeff Lewis   MRN:     [id number] Gender:   male   Age:    58 year old :      1960        Preoperative Diagnosis: CNS Demyelinating Disease   Procedure(s):  Removal Of Intrathecal Drug Delivery System, Removal Of Intrathecal Catheter From The Spine And Removal Of The Pump From The Right Abdomen     Past Medical History:   Diagnosis Date     Arthritis      Back pain      Coarctation of aorta      CTS (carpal tunnel syndrome)      Dyslipidemia      Falls     past hx of falls     MS (multiple sclerosis) (H)      Other chronic pain      Pain 8/15/2015     Pneumonia     \"yearly\" per pt\"     Polyuria      Preiser disease (H)      Right bundle branch block      Vitamin D deficiency       Past Surgical History:   Procedure Laterality Date     bilateral carapl tunnel       CHOLECYSTECTOMY       ENT SURGERY       GALLBLADDER SURGERY       INSERT INTRATHECAL PAIN PUMP       REPLACE INTRATHECAL PAIN PUMP N/A 2015    Procedure: REPLACE INTRATHECAL PAIN PUMP;  Surgeon: Sukumar Salinas MD;  Location: [facility] OR     WRIST SURGERY            Anesthesia Evaluation     . Pt has had prior anesthetic. Type: General    No history of anesthetic complications          ROS/MED HX    ENT/Pulmonary:       Neurologic: Comment: Chronic right sided neck pain with no weakness  Multiple sclerosis on glatiramer acetate    (+)Multiple Sclerosis    CVA: bicuspid aortic valve.   Cardiovascular:     (+) ----. : . . . :. valvular problems/murmurs bicuspid aortic valve:. congenital heart disease ( bicuspid aortic valve ) coarctation of aorta since 1970:, Previous cardiac testing Echodate:results:CONCLUSION:    Normal LV size and systolic function. EF 65%    Normal RV size and function.    Mild LA dilatation.    Bicuspid aortic valve.    Mildly dilated aortic root. 4.3cm      Arch-desc ao-max grad-25 mmHg, mn grad-13 mmHg-similar to prior    echo      Left Ventricular Ejection " "Fraction: 65 %     ERIN 4.0 cm^2 by continuity eqn, 3.8 by cont. peakdate: results: date: results: date: results:          METS/Exercise Tolerance:     Hematologic:  - neg hematologic  ROS       Musculoskeletal:  - neg musculoskeletal ROS       GI/Hepatic:  - neg GI/hepatic ROS       Renal/Genitourinary:  - ROS Renal section negative       Endo: Comment: Body mass index is 34.09 kg/m .      (+) type II DM Obesity, .      Psychiatric:     (+) psychiatric history depression      Infectious Disease:  - neg infectious disease ROS       Malignancy:      - no malignancy   Other:    (+) H/O Chronic Pain,  - neg other ROS                     PHYSICAL EXAM:   Mental Status/Neuro: A/A/O   Airway: Facies: Feasible   Respiratory:   Resp. Effort: Normal      CV:   Rate: Age appropriate   Comments:                    Lab Results   Component Value Date    WBC 9.1 01/28/2019    HGB 16.1 01/28/2019    HCT 47.7 01/28/2019     01/28/2019    SED 9 09/29/2016     01/28/2019    POTASSIUM 4.1 01/28/2019    CHLORIDE 104 01/28/2019    CO2 26 01/28/2019    BUN 17 01/28/2019    CR 0.85 01/28/2019     (H) 01/28/2019    MICHELET 8.8 01/28/2019    PHOS 2.4 (L) 03/26/2017    MAG 2.0 03/26/2017    ALBUMIN 3.6 03/26/2017    PROTTOTAL 7.8 03/26/2017    ALT 21 03/26/2017    AST 13 03/26/2017    ALKPHOS 136 03/26/2017    BILITOTAL 0.5 03/26/2017    INR 1.12 03/26/2017       Preop Vitals  BP Readings from Last 3 Encounters:   01/28/19 156/80   01/10/19 127/78   11/15/18 157/81    Pulse Readings from Last 3 Encounters:   01/28/19 72   01/10/19 91   11/15/18 76      Resp Readings from Last 3 Encounters:   01/28/19 18   10/05/17 16   10/05/17 16    SpO2 Readings from Last 3 Encounters:   01/28/19 98%   01/10/19 96%   10/05/17 97%      Temp Readings from Last 1 Encounters:   01/28/19 36.5  C (97.7  F) (Oral)    Ht Readings from Last 1 Encounters:   01/28/19 1.854 m (6' 1\")      Wt Readings from Last 1 Encounters:   01/28/19 118.3 kg (260 lb " "14.4 oz)    Estimated body mass index is 34.42 kg/m  as calculated from the following:    Height as of 1/28/19: 1.854 m (6' 1\").    Weight as of 1/28/19: 118.3 kg (260 lb 14.4 oz).     LDA:            Assessment:   ASA SCORE: 3    NPO Status: > 6 hours since completed Solid Foods   Documentation: H&P complete; Preop Testing complete; Consents complete   Proceeding: Proceed without further delay  Tobacco Use:  NO Active use of Tobacco/UNKNOWN Tobacco use status     Plan:   Anes. Type:  General   Pre-Induction: Midazolam IV; Acetaminophen PO; Gabapentin PO   Induction:  IV (Standard)   Airway: Oral ETT   Access/Monitoring: PIV; 2nd PIV   Maintenance: Balanced   Emergence: Procedure Site   Logistics: Same Day Surgery     Postop Pain/Sedation Strategy:  Standard-Options: Opioids PRN     PONV Management:  Adult Risk Factors:, Non-Smoker, Postop Opioids  Prevention: Ondansetron; Dexamethasone     CONSENT: Direct conversation   Plan and risks discussed with: Patient                        58yM presents for removal of IT pump and reservoir. Hx MS and chronic neck/occiput pain that has been refractory to all treatments. He is not currently on any meds other than ibuprofen due to previous issues w/ withdrawal from opioids and financial difficulties with meds for this pump. He has a history of complications after the second operation for placement of this pump and reports that there are currently 2 reservoirs in place due to inability to remove the first during the replacement operation. In addition to MS he is obese and has DM2 but does not take Rx's for this. He is hyperglycemic today. We have started an insulin infusion and sent an A1C. He has Hx bicuspid Aortic valve and coarctation but with acceptable gradients on echo and cath in the last few years. Plan for GETA, 2nd IV, remote possibility of an arterial line. Decadron+zofran for PONV ppx. Titration of opioids in PACU and rescue for PONV PRN.   ___________________   " Bo Kim MD          Pager: [phone]

## 2019-02-06 ENCOUNTER — APPOINTMENT (OUTPATIENT)
Dept: GENERAL RADIOLOGY | Facility: CLINIC | Age: 59
End: 2019-02-06
Attending: NEUROLOGICAL SURGERY
Payer: COMMERCIAL

## 2019-02-06 ENCOUNTER — ANESTHESIA (OUTPATIENT)
Dept: SURGERY | Facility: CLINIC | Age: 59
End: 2019-02-06
Payer: COMMERCIAL

## 2019-02-06 ENCOUNTER — HOSPITAL ENCOUNTER (OUTPATIENT)
Facility: CLINIC | Age: 59
Discharge: HOME | End: 2019-02-06
Attending: NEUROLOGICAL SURGERY | Admitting: NEUROLOGICAL SURGERY
Payer: COMMERCIAL

## 2019-02-06 VITALS
RESPIRATION RATE: 16 BRPM | SYSTOLIC BLOOD PRESSURE: 137 MMHG | TEMPERATURE: 97.7 F | BODY MASS INDEX: 34.24 KG/M2 | OXYGEN SATURATION: 97 % | HEIGHT: 73 IN | WEIGHT: 258.38 LBS | DIASTOLIC BLOOD PRESSURE: 70 MMHG | HEART RATE: 71 BPM

## 2019-02-06 DIAGNOSIS — Z98.890 POST-OPERATIVE STATE: Primary | ICD-10-CM

## 2019-02-06 LAB
ABO + RH BLD: NORMAL
ABO + RH BLD: NORMAL
APTT PPP: 27 SEC (ref 22–37)
BLOOD BANK CMNT PATIENT-IMP: NORMAL
GLUCOSE BLDC GLUCOMTR-MCNC: 142 MG/DL (ref 70–99)
GLUCOSE BLDC GLUCOMTR-MCNC: 159 MG/DL (ref 70–99)
GLUCOSE BLDC GLUCOMTR-MCNC: 182 MG/DL (ref 70–99)
GLUCOSE BLDC GLUCOMTR-MCNC: 204 MG/DL (ref 70–99)
HBA1C MFR BLD: 8.5 % (ref 0–5.6)
INR PPP: 1.05 (ref 0.86–1.14)
SPECIMEN EXP DATE BLD: NORMAL

## 2019-02-06 PROCEDURE — 25000128 H RX IP 250 OP 636: Performed by: NEUROLOGICAL SURGERY

## 2019-02-06 PROCEDURE — 37000009 ZZH ANESTHESIA TECHNICAL FEE, EACH ADDTL 15 MIN: Performed by: NEUROLOGICAL SURGERY

## 2019-02-06 PROCEDURE — 85730 THROMBOPLASTIN TIME PARTIAL: CPT | Performed by: NEUROLOGICAL SURGERY

## 2019-02-06 PROCEDURE — 25000132 ZZH RX MED GY IP 250 OP 250 PS 637: Performed by: STUDENT IN AN ORGANIZED HEALTH CARE EDUCATION/TRAINING PROGRAM

## 2019-02-06 PROCEDURE — 71000027 ZZH RECOVERY PHASE 2 EACH 15 MINS: Performed by: NEUROLOGICAL SURGERY

## 2019-02-06 PROCEDURE — 25000125 ZZHC RX 250: Performed by: ANESTHESIOLOGY

## 2019-02-06 PROCEDURE — 27210794 ZZH OR GENERAL SUPPLY STERILE: Performed by: NEUROLOGICAL SURGERY

## 2019-02-06 PROCEDURE — 25000128 H RX IP 250 OP 636: Performed by: ANESTHESIOLOGY

## 2019-02-06 PROCEDURE — 40000170 ZZH STATISTIC PRE-PROCEDURE ASSESSMENT II: Performed by: NEUROLOGICAL SURGERY

## 2019-02-06 PROCEDURE — 40000277 XR SURGERY CARM FLUORO LESS THAN 5 MIN W STILLS: Mod: TC

## 2019-02-06 PROCEDURE — 83036 HEMOGLOBIN GLYCOSYLATED A1C: CPT | Performed by: ANESTHESIOLOGY

## 2019-02-06 PROCEDURE — 25000565 ZZH ISOFLURANE, EA 15 MIN: Performed by: NEUROLOGICAL SURGERY

## 2019-02-06 PROCEDURE — 86901 BLOOD TYPING SEROLOGIC RH(D): CPT | Performed by: STUDENT IN AN ORGANIZED HEALTH CARE EDUCATION/TRAINING PROGRAM

## 2019-02-06 PROCEDURE — 25000128 H RX IP 250 OP 636: Performed by: STUDENT IN AN ORGANIZED HEALTH CARE EDUCATION/TRAINING PROGRAM

## 2019-02-06 PROCEDURE — 85610 PROTHROMBIN TIME: CPT | Performed by: NEUROLOGICAL SURGERY

## 2019-02-06 PROCEDURE — 82962 GLUCOSE BLOOD TEST: CPT

## 2019-02-06 PROCEDURE — 36000053 ZZH SURGERY LEVEL 2 EA 15 ADDTL MIN - UMMC: Performed by: NEUROLOGICAL SURGERY

## 2019-02-06 PROCEDURE — 37000008 ZZH ANESTHESIA TECHNICAL FEE, 1ST 30 MIN: Performed by: NEUROLOGICAL SURGERY

## 2019-02-06 PROCEDURE — 86900 BLOOD TYPING SEROLOGIC ABO: CPT | Performed by: STUDENT IN AN ORGANIZED HEALTH CARE EDUCATION/TRAINING PROGRAM

## 2019-02-06 PROCEDURE — 71000014 ZZH RECOVERY PHASE 1 LEVEL 2 FIRST HR: Performed by: NEUROLOGICAL SURGERY

## 2019-02-06 PROCEDURE — 25000125 ZZHC RX 250: Performed by: NEUROLOGICAL SURGERY

## 2019-02-06 PROCEDURE — 25000125 ZZHC RX 250: Performed by: STUDENT IN AN ORGANIZED HEALTH CARE EDUCATION/TRAINING PROGRAM

## 2019-02-06 PROCEDURE — 36000051 ZZH SURGERY LEVEL 2 1ST 30 MIN - UMMC: Performed by: NEUROLOGICAL SURGERY

## 2019-02-06 RX ORDER — FENTANYL CITRATE 50 UG/ML
25-50 INJECTION, SOLUTION INTRAMUSCULAR; INTRAVENOUS
Status: DISCONTINUED | OUTPATIENT
Start: 2019-02-06 | End: 2019-02-06 | Stop reason: HOSPADM

## 2019-02-06 RX ORDER — OXYCODONE HYDROCHLORIDE 5 MG/1
5 TABLET ORAL EVERY 6 HOURS PRN
Qty: 30 TABLET | Refills: 0 | Status: SHIPPED | OUTPATIENT
Start: 2019-02-06 | End: 2019-02-13

## 2019-02-06 RX ORDER — CEFAZOLIN SODIUM 2 G/100ML
2 INJECTION, SOLUTION INTRAVENOUS
Status: DISCONTINUED | OUTPATIENT
Start: 2019-02-06 | End: 2019-02-06 | Stop reason: HOSPADM

## 2019-02-06 RX ORDER — NICOTINE POLACRILEX 4 MG
15-30 LOZENGE BUCCAL
Status: DISCONTINUED | OUTPATIENT
Start: 2019-02-06 | End: 2019-02-06 | Stop reason: HOSPADM

## 2019-02-06 RX ORDER — LIDOCAINE HYDROCHLORIDE 20 MG/ML
INJECTION, SOLUTION INFILTRATION; PERINEURAL PRN
Status: DISCONTINUED | OUTPATIENT
Start: 2019-02-06 | End: 2019-02-06

## 2019-02-06 RX ORDER — SODIUM CHLORIDE, SODIUM LACTATE, POTASSIUM CHLORIDE, CALCIUM CHLORIDE 600; 310; 30; 20 MG/100ML; MG/100ML; MG/100ML; MG/100ML
INJECTION, SOLUTION INTRAVENOUS CONTINUOUS
Status: DISCONTINUED | OUTPATIENT
Start: 2019-02-06 | End: 2019-02-06 | Stop reason: HOSPADM

## 2019-02-06 RX ORDER — DEXTROSE MONOHYDRATE 25 G/50ML
25-50 INJECTION, SOLUTION INTRAVENOUS
Status: DISCONTINUED | OUTPATIENT
Start: 2019-02-06 | End: 2019-02-06 | Stop reason: HOSPADM

## 2019-02-06 RX ORDER — ONDANSETRON 2 MG/ML
INJECTION INTRAMUSCULAR; INTRAVENOUS PRN
Status: DISCONTINUED | OUTPATIENT
Start: 2019-02-06 | End: 2019-02-06

## 2019-02-06 RX ORDER — ACETAMINOPHEN 325 MG/1
975 TABLET ORAL ONCE
Status: COMPLETED | OUTPATIENT
Start: 2019-02-06 | End: 2019-02-06

## 2019-02-06 RX ORDER — NALOXONE HYDROCHLORIDE 0.4 MG/ML
.1-.4 INJECTION, SOLUTION INTRAMUSCULAR; INTRAVENOUS; SUBCUTANEOUS
Status: DISCONTINUED | OUTPATIENT
Start: 2019-02-06 | End: 2019-02-06 | Stop reason: HOSPADM

## 2019-02-06 RX ORDER — BUPIVACAINE HYDROCHLORIDE AND EPINEPHRINE 5; 5 MG/ML; UG/ML
INJECTION, SOLUTION PERINEURAL PRN
Status: DISCONTINUED | OUTPATIENT
Start: 2019-02-06 | End: 2019-02-06 | Stop reason: HOSPADM

## 2019-02-06 RX ORDER — PROPOFOL 10 MG/ML
INJECTION, EMULSION INTRAVENOUS PRN
Status: DISCONTINUED | OUTPATIENT
Start: 2019-02-06 | End: 2019-02-06

## 2019-02-06 RX ORDER — SODIUM CHLORIDE 9 MG/ML
INJECTION, SOLUTION INTRAVENOUS CONTINUOUS
Status: DISCONTINUED | OUTPATIENT
Start: 2019-02-06 | End: 2019-02-06 | Stop reason: HOSPADM

## 2019-02-06 RX ORDER — FENTANYL CITRATE 50 UG/ML
INJECTION, SOLUTION INTRAMUSCULAR; INTRAVENOUS PRN
Status: DISCONTINUED | OUTPATIENT
Start: 2019-02-06 | End: 2019-02-06

## 2019-02-06 RX ORDER — EPHEDRINE SULFATE 50 MG/ML
INJECTION, SOLUTION INTRAMUSCULAR; INTRAVENOUS; SUBCUTANEOUS PRN
Status: DISCONTINUED | OUTPATIENT
Start: 2019-02-06 | End: 2019-02-06

## 2019-02-06 RX ORDER — CEFAZOLIN SODIUM 1 G/3ML
1 INJECTION, POWDER, FOR SOLUTION INTRAMUSCULAR; INTRAVENOUS SEE ADMIN INSTRUCTIONS
Status: DISCONTINUED | OUTPATIENT
Start: 2019-02-06 | End: 2019-02-06 | Stop reason: HOSPADM

## 2019-02-06 RX ORDER — GABAPENTIN 300 MG/1
300 CAPSULE ORAL ONCE
Status: COMPLETED | OUTPATIENT
Start: 2019-02-06 | End: 2019-02-06

## 2019-02-06 RX ORDER — CEPHALEXIN 500 MG/1
500 CAPSULE ORAL 4 TIMES DAILY
Qty: 28 CAPSULE | Refills: 0 | Status: SHIPPED | OUTPATIENT
Start: 2019-02-06 | End: 2019-02-13

## 2019-02-06 RX ORDER — ONDANSETRON 2 MG/ML
4 INJECTION INTRAMUSCULAR; INTRAVENOUS EVERY 30 MIN PRN
Status: DISCONTINUED | OUTPATIENT
Start: 2019-02-06 | End: 2019-02-06 | Stop reason: HOSPADM

## 2019-02-06 RX ORDER — ACETAMINOPHEN 325 MG/1
325-650 TABLET ORAL EVERY 6 HOURS PRN
Qty: 1 BOTTLE | Refills: 0 | Status: SHIPPED | OUTPATIENT
Start: 2019-02-06 | End: 2019-02-20

## 2019-02-06 RX ORDER — ONDANSETRON 4 MG/1
4 TABLET, ORALLY DISINTEGRATING ORAL EVERY 30 MIN PRN
Status: DISCONTINUED | OUTPATIENT
Start: 2019-02-06 | End: 2019-02-06 | Stop reason: HOSPADM

## 2019-02-06 RX ADMIN — PHENYLEPHRINE HYDROCHLORIDE 100 MCG: 10 INJECTION, SOLUTION INTRAMUSCULAR; INTRAVENOUS; SUBCUTANEOUS at 12:46

## 2019-02-06 RX ADMIN — LIDOCAINE HYDROCHLORIDE 100 MG: 20 INJECTION, SOLUTION INFILTRATION; PERINEURAL at 12:21

## 2019-02-06 RX ADMIN — MIDAZOLAM 2 MG: 1 INJECTION INTRAMUSCULAR; INTRAVENOUS at 12:18

## 2019-02-06 RX ADMIN — ONDANSETRON 4 MG: 2 INJECTION INTRAMUSCULAR; INTRAVENOUS at 13:29

## 2019-02-06 RX ADMIN — HUMAN INSULIN 2 UNITS/HR: 100 INJECTION, SOLUTION SUBCUTANEOUS at 10:46

## 2019-02-06 RX ADMIN — PHENYLEPHRINE HYDROCHLORIDE 100 MCG: 10 INJECTION, SOLUTION INTRAMUSCULAR; INTRAVENOUS; SUBCUTANEOUS at 12:24

## 2019-02-06 RX ADMIN — PHENYLEPHRINE HYDROCHLORIDE 100 MCG: 10 INJECTION, SOLUTION INTRAMUSCULAR; INTRAVENOUS; SUBCUTANEOUS at 14:00

## 2019-02-06 RX ADMIN — PHENYLEPHRINE HYDROCHLORIDE 0.5 MCG/KG/MIN: 10 INJECTION, SOLUTION INTRAMUSCULAR; INTRAVENOUS; SUBCUTANEOUS at 13:12

## 2019-02-06 RX ADMIN — SUGAMMADEX 200 MG: 100 INJECTION, SOLUTION INTRAVENOUS at 14:07

## 2019-02-06 RX ADMIN — FENTANYL CITRATE 250 MCG: 50 INJECTION, SOLUTION INTRAMUSCULAR; INTRAVENOUS at 12:20

## 2019-02-06 RX ADMIN — ROCURONIUM BROMIDE 20 MG: 10 INJECTION INTRAVENOUS at 13:08

## 2019-02-06 RX ADMIN — PHENYLEPHRINE HYDROCHLORIDE 100 MCG: 10 INJECTION, SOLUTION INTRAMUSCULAR; INTRAVENOUS; SUBCUTANEOUS at 13:01

## 2019-02-06 RX ADMIN — GABAPENTIN 300 MG: 300 CAPSULE ORAL at 10:57

## 2019-02-06 RX ADMIN — PHENYLEPHRINE HYDROCHLORIDE 100 MCG: 10 INJECTION, SOLUTION INTRAMUSCULAR; INTRAVENOUS; SUBCUTANEOUS at 12:34

## 2019-02-06 RX ADMIN — Medication 5 MG: at 12:32

## 2019-02-06 RX ADMIN — PROPOFOL 100 MG: 10 INJECTION, EMULSION INTRAVENOUS at 12:22

## 2019-02-06 RX ADMIN — PHENYLEPHRINE HYDROCHLORIDE 200 MCG: 10 INJECTION, SOLUTION INTRAMUSCULAR; INTRAVENOUS; SUBCUTANEOUS at 12:56

## 2019-02-06 RX ADMIN — ROCURONIUM BROMIDE 50 MG: 10 INJECTION INTRAVENOUS at 12:20

## 2019-02-06 RX ADMIN — PHENYLEPHRINE HYDROCHLORIDE 100 MCG: 10 INJECTION, SOLUTION INTRAMUSCULAR; INTRAVENOUS; SUBCUTANEOUS at 14:07

## 2019-02-06 RX ADMIN — CEFAZOLIN SODIUM 2 G: 2 INJECTION, SOLUTION INTRAVENOUS at 12:25

## 2019-02-06 RX ADMIN — SODIUM CHLORIDE: 9 INJECTION, SOLUTION INTRAVENOUS at 10:45

## 2019-02-06 RX ADMIN — Medication 5 MG: at 12:41

## 2019-02-06 RX ADMIN — ACETAMINOPHEN 975 MG: 325 TABLET, FILM COATED ORAL at 10:56

## 2019-02-06 ASSESSMENT — MIFFLIN-ST. JEOR: SCORE: 2035.88

## 2019-02-06 NOTE — OR NURSING
Switched arms we were taking BP on, pressure came up, Dr. Chance state it may be underlying pathophysiology. Patient still feels fine, pressures maintaining on left arm.

## 2019-02-06 NOTE — BRIEF OP NOTE
[facility] Brief Operative Note    Pre-operative diagnosis: S/p pain pump   Post-operative diagnosis S/p pain pump   Procedure: Procedure(s):  Removal Of Intrathecal Drug Delivery System, Removal Of Intrathecal Catheter From The Spine And Removal Of The Pump From The Right Abdomen   Surgeon(s): Surgeon(s) and Role:     * Jeff Rees MD - Primary     * Karthik Blake MD - Resident - Assisting     * Ashvin Tillman MD - Resident - Assisting   Estimated blood loss: 5 mL   Specimens: * No specimens in log *   Findings:      Complications:  Explant: Two catheters, two anchors and one pump removed.  One sutureless connector system also removed.  No cerebrospinal fluid leak seen.  Catheter tips visualized.  None.  One old Medtronic catheter with anchor.  One Ascenda Medtronic catheter with anchor.  Medtronic SynchroMed II 20 mL pump.  One sutureless connector system.

## 2019-02-06 NOTE — OR NURSING
Called Dr. Kim regarding hypotension. Dr. Kim presents to bedside and states as long as maps are over 60 and patient mentating fine he's ok with pressures.

## 2019-02-06 NOTE — ANESTHESIA POSTPROCEDURE EVALUATION
Anesthesia POST Procedure Evaluation    Patient: Jeff Lewis   MRN:     [id number] Gender:   male   Age:    58 year old :      1960        Preoperative Diagnosis: CNS Demyelinating Disease   Procedure(s):  Removal Of Intrathecal Drug Delivery System, Removal Of Intrathecal Catheter From The Spine And Removal Of The Pump From The Right Abdomen   Postop Comments: No value filed.       Anesthesia Type:  General    Reportable Event: NO     PAIN: Uncomplicated   Sign Out status: Comfortable, Well controlled pain     PONV: No PONV   Sign Out status:  No Nausea or Vomiting     Neuro/Psych: Uneventful perioperative course   Sign Out Status: Preoperative baseline; Age appropriate mentation     Airway/Resp.: Uneventful perioperative course   Sign Out Status: Non labored breathing, age appropriate RR; Resp. Status within EXPECTED Parameters     CV: Uneventful perioperative course   Sign Out status: Appropriate BP and perfusion indices; Appropriate HR/Rhythm     Disposition:   Sign Out in:  PACU  Disposition:  Phase II; Home  Recovery Course: Uneventful  Follow-Up: Not required           Last Anesthesia Record Vitals:  CRNA VITALS  2019 1341 - 2019 1441      2019             Resp Rate (set):  10          Last PACU/Preop Vitals:  Vitals:    19 1000 19 1430 19 1445   BP: 116/59 94/76 (!) 83/69   Pulse: 68 66 65   Resp: 16 16    Temp: 37  C (98.6  F) 36.6  C (97.9  F)    SpO2: 98% 98% 99%         Electronically Signed By: Frankie Carter MD, 2019, 2:51 PM

## 2019-02-06 NOTE — ANESTHESIA CARE TRANSFER NOTE
Patient: Jeff Lewis    Procedure(s):  Removal Of Intrathecal Drug Delivery System, Removal Of Intrathecal Catheter From The Spine And Removal Of The Pump From The Right Abdomen    Diagnosis: CNS Demyelinating Disease  Diagnosis Additional Information: No value filed.    Anesthesia Type:   No value filed.     Note:  Airway :Nasal Cannula  Patient transferred to:PACU  Comments: Extubated in OR 20, transferred to PACU with oxygen, hemodynamically stable. Upon arrival to PACU, pain is nil, no nausea. SpO2 98% on 4L O2 via nasal cannula.     Clara Mendoza Report: Identifed the Patient, Identified the Reponsible Provider, Reviewed the pertinent medical history, Discussed the surgical course, Reviewed Intra-OP anesthesia mangement and issues during anesthesia, Set expectations for post-procedure period and Allowed opportunity for questions and acknowledgement of understanding      Vitals: (Last set prior to Anesthesia Care Transfer)    CRNA VITALS  2/6/2019 1341 - 2/6/2019 1428      2/6/2019             Resp Rate (set):  10                Electronically Signed By: Clara Murrell MD  February 6, 2019  2:28 PM

## 2019-02-06 NOTE — DISCHARGE INSTRUCTIONS
[facility]  Same-Day Surgery   Adult Discharge Orders & Instructions     For 24 hours after surgery    1. Get plenty of rest.  A responsible adult must stay with you for at least 24 hours after you leave the hospital.   2. Do not drive or use heavy equipment.  If you have weakness or tingling, don't drive or use heavy equipment until this feeling goes away.  3. Do not drink alcohol.  4. Avoid strenuous or risky activities.  Ask for help when climbing stairs.   5. You may feel lightheaded.  IF so, sit for a few minutes before standing.  Have someone help you get up.   6. If you have nausea (feel sick to your stomach): Drink only clear liquids such as apple juice, ginger ale, broth or 7-Up.  Rest may also help.  Be sure to drink enough fluids.  Move to a regular diet as you feel able.  7. You may have a slight fever. Call the doctor if your fever is over 100 F (37.7 C) (taken under the tongue) or lasts longer than 24 hours.  8. You may have a dry mouth, a sore throat, muscle aches or trouble sleeping.  These should go away after 24 hours.  9. Do not make important or legal decisions.   Call your doctor for any of the followin.  Signs of infection (fever, growing tenderness at the surgery site, a large amount of drainage or bleeding, severe pain, foul-smelling drainage, redness, swelling).    2. It has been over 8 to 10 hours since surgery and you are still not able to urinate (pass water).    3.  Headache for over 24 hours.    4.  Numbness, tingling or weakness the day after surgery (if you had spinal anesthesia).  To contact a doctor, call ___Dr. Rees [phone]____ or:        [phone] and ask for the resident on call for   __Neuro surgery__ (answered 24 hours a day)      Emergency Department:    [facility]: [phone]       (TTY for hearing impaired: [phone])

## 2019-02-07 ENCOUNTER — PATIENT OUTREACH (OUTPATIENT)
Dept: NEUROSURGERY | Facility: CLINIC | Age: 59
End: 2019-02-07

## 2019-02-07 ENCOUNTER — HOSPITAL ENCOUNTER (EMERGENCY)
Facility: CLINIC | Age: 59
Discharge: HOME | End: 2019-02-07
Attending: EMERGENCY MEDICINE | Admitting: EMERGENCY MEDICINE
Payer: COMMERCIAL

## 2019-02-07 VITALS
HEIGHT: 73 IN | HEART RATE: 65 BPM | TEMPERATURE: 97.6 F | RESPIRATION RATE: 18 BRPM | SYSTOLIC BLOOD PRESSURE: 147 MMHG | WEIGHT: 258 LBS | DIASTOLIC BLOOD PRESSURE: 76 MMHG | BODY MASS INDEX: 34.19 KG/M2 | OXYGEN SATURATION: 96 %

## 2019-02-07 DIAGNOSIS — R11.2 NAUSEA AND VOMITING IN ADULT: ICD-10-CM

## 2019-02-07 DIAGNOSIS — R33.9 URINARY RETENTION: ICD-10-CM

## 2019-02-07 LAB
ALBUMIN UR-MCNC: 10 MG/DL
ANION GAP SERPL CALCULATED.3IONS-SCNC: 7 MMOL/L (ref 3–14)
APPEARANCE UR: CLEAR
BASOPHILS # BLD AUTO: 0 10E9/L (ref 0–0.2)
BASOPHILS NFR BLD AUTO: 0.2 %
BILIRUB UR QL STRIP: NEGATIVE
BUN SERPL-MCNC: 17 MG/DL (ref 7–30)
CALCIUM SERPL-MCNC: 8.5 MG/DL (ref 8.5–10.1)
CHLORIDE SERPL-SCNC: 103 MMOL/L (ref 94–109)
CO2 SERPL-SCNC: 28 MMOL/L (ref 20–32)
COLOR UR AUTO: YELLOW
CREAT SERPL-MCNC: 0.76 MG/DL (ref 0.66–1.25)
DIFFERENTIAL METHOD BLD: NORMAL
EOSINOPHIL # BLD AUTO: 0.1 10E9/L (ref 0–0.7)
EOSINOPHIL NFR BLD AUTO: 1.1 %
ERYTHROCYTE [DISTWIDTH] IN BLOOD BY AUTOMATED COUNT: 13 % (ref 10–15)
GFR SERPL CREATININE-BSD FRML MDRD: >90 ML/MIN/{1.73_M2}
GLUCOSE SERPL-MCNC: 193 MG/DL (ref 70–99)
GLUCOSE UR STRIP-MCNC: 300 MG/DL
HCT VFR BLD AUTO: 44.4 % (ref 40–53)
HGB BLD-MCNC: 15.2 G/DL (ref 13.3–17.7)
HGB UR QL STRIP: NEGATIVE
IMM GRANULOCYTES # BLD: 0 10E9/L (ref 0–0.4)
IMM GRANULOCYTES NFR BLD: 0.2 %
KETONES UR STRIP-MCNC: 40 MG/DL
LEUKOCYTE ESTERASE UR QL STRIP: NEGATIVE
LYMPHOCYTES # BLD AUTO: 3 10E9/L (ref 0.8–5.3)
LYMPHOCYTES NFR BLD AUTO: 31.2 %
MCH RBC QN AUTO: 31.9 PG (ref 26.5–33)
MCHC RBC AUTO-ENTMCNC: 34.2 G/DL (ref 31.5–36.5)
MCV RBC AUTO: 93 FL (ref 78–100)
MONOCYTES # BLD AUTO: 0.7 10E9/L (ref 0–1.3)
MONOCYTES NFR BLD AUTO: 7.4 %
MUCOUS THREADS #/AREA URNS LPF: PRESENT /LPF
NEUTROPHILS # BLD AUTO: 5.7 10E9/L (ref 1.6–8.3)
NEUTROPHILS NFR BLD AUTO: 59.9 %
NITRATE UR QL: NEGATIVE
NRBC # BLD AUTO: 0 10*3/UL
NRBC BLD AUTO-RTO: 0 /100
PH UR STRIP: 6.5 PH (ref 5–7)
PLATELET # BLD AUTO: 175 10E9/L (ref 150–450)
POTASSIUM SERPL-SCNC: 3.9 MMOL/L (ref 3.4–5.3)
RBC # BLD AUTO: 4.76 10E12/L (ref 4.4–5.9)
RBC #/AREA URNS AUTO: 1 /HPF (ref 0–2)
SODIUM SERPL-SCNC: 139 MMOL/L (ref 133–144)
SOURCE: ABNORMAL
SP GR UR STRIP: 1.02 (ref 1–1.03)
SQUAMOUS #/AREA URNS AUTO: <1 /HPF (ref 0–1)
UROBILINOGEN UR STRIP-MCNC: NORMAL MG/DL (ref 0–2)
WBC # BLD AUTO: 9.5 10E9/L (ref 4–11)
WBC #/AREA URNS AUTO: 1 /HPF (ref 0–5)

## 2019-02-07 PROCEDURE — 87086 URINE CULTURE/COLONY COUNT: CPT | Performed by: EMERGENCY MEDICINE

## 2019-02-07 PROCEDURE — 80048 BASIC METABOLIC PNL TOTAL CA: CPT | Performed by: EMERGENCY MEDICINE

## 2019-02-07 PROCEDURE — 96361 HYDRATE IV INFUSION ADD-ON: CPT | Performed by: EMERGENCY MEDICINE

## 2019-02-07 PROCEDURE — 96374 THER/PROPH/DIAG INJ IV PUSH: CPT | Performed by: EMERGENCY MEDICINE

## 2019-02-07 PROCEDURE — 25000128 H RX IP 250 OP 636: Performed by: EMERGENCY MEDICINE

## 2019-02-07 PROCEDURE — 85025 COMPLETE CBC W/AUTO DIFF WBC: CPT | Performed by: EMERGENCY MEDICINE

## 2019-02-07 PROCEDURE — 99284 EMERGENCY DEPT VISIT MOD MDM: CPT | Mod: Z6 | Performed by: EMERGENCY MEDICINE

## 2019-02-07 PROCEDURE — 81001 URINALYSIS AUTO W/SCOPE: CPT | Performed by: EMERGENCY MEDICINE

## 2019-02-07 PROCEDURE — 99284 EMERGENCY DEPT VISIT MOD MDM: CPT | Mod: 25 | Performed by: EMERGENCY MEDICINE

## 2019-02-07 PROCEDURE — 51798 US URINE CAPACITY MEASURE: CPT | Performed by: EMERGENCY MEDICINE

## 2019-02-07 RX ORDER — ONDANSETRON 2 MG/ML
4 INJECTION INTRAMUSCULAR; INTRAVENOUS EVERY 30 MIN PRN
Status: DISCONTINUED | OUTPATIENT
Start: 2019-02-07 | End: 2019-02-07 | Stop reason: HOSPADM

## 2019-02-07 RX ORDER — SODIUM CHLORIDE 9 MG/ML
1000 INJECTION, SOLUTION INTRAVENOUS CONTINUOUS
Status: DISCONTINUED | OUTPATIENT
Start: 2019-02-07 | End: 2019-02-07 | Stop reason: HOSPADM

## 2019-02-07 RX ORDER — ONDANSETRON 4 MG/1
4 TABLET, FILM COATED ORAL EVERY 8 HOURS PRN
Qty: 8 TABLET | Refills: 0 | Status: SHIPPED | OUTPATIENT
Start: 2019-02-07 | End: 2019-02-09

## 2019-02-07 RX ADMIN — SODIUM CHLORIDE 1000 ML: 9 INJECTION, SOLUTION INTRAVENOUS at 04:48

## 2019-02-07 RX ADMIN — ONDANSETRON 4 MG: 2 INJECTION INTRAMUSCULAR; INTRAVENOUS at 04:49

## 2019-02-07 ASSESSMENT — ENCOUNTER SYMPTOMS
VOMITING: 1
NAUSEA: 1
ABDOMINAL PAIN: 1
DIFFICULTY URINATING: 1

## 2019-02-07 ASSESSMENT — MIFFLIN-ST. JEOR: SCORE: 2034.16

## 2019-02-07 NOTE — PROGRESS NOTES
Pt is s/p intrathecal pump and intrathecal catheter removal on 2/6/1019 by Dr. Rees. Called pt to check on his post-operative status but got VM. Left a message requesting that he call me on my direct line. Also left the number for the on-call neurosurgery resident should he have concerns after regular business hours. I will f/u if I do not hear back.

## 2019-02-07 NOTE — DISCHARGE INSTRUCTIONS
Thank you for your patience today.  Please follow-up with your regular doctor in the next 2-3 days for further evaluation and follow-up care.  Please call to schedule an appointment.  Please continue your own medications.  Please take Zofran 1 tablet as needed for nausea and vomiting.  Please wait approximately 30 minutes before trying to eat or drink anything.  Please make sure you are drinking plenty of water.  Please return to the ER if you develop high fever, persistent vomiting, severe pain, urinary retention, or any worsening of your current symptoms.  It was a pleasure taking care of you today.  We hope you feel better soon.

## 2019-02-07 NOTE — ED PROVIDER NOTES
"  History     Chief Complaint   Patient presents with     Urinary Retention     Nausea & Vomiting     HPI  Jeff Lewis is a 58 year old male who has a past medical history of multiple sclerosis, chronic right-sided neck pain with intrathecal drug delivery system in place 8/14/2015 which was removed yesterday 2/6/2019 by neurosurgery with no complications who presented to the emergency department from home with his wife with a complaint of urinary retention, nausea, vomiting.  Patient reports that he was doing well and had his surgery yesterday around 12 noon.  Patient reports that he was under anesthesia, sedated, and intubated for the procedure.  Patient reports he woke up feeling okay.  Patient states he last urinated around 12 noon just prior to the procedure.  Patient states he was discharged around 1700 last night and on their way home he developed nausea, vomiting.  Patient reports multiple episodes of nausea and vomiting since last night and has still been unable to urinate.  Patient denies any fever, chills, chest pain, shortness of breath.  Patient reports some mild abdominal discomfort around the incision.  Denies any constipation, diarrhea, last bowel movement was 2 days ago.  No other complaints.  Patient does have a history of multiple sclerosis however he states that he has never had any urinary symptoms.  Patient denies any narcotic use.  No other complaints.    I have reviewed the Medications, Allergies, Past Medical and Surgical History, and Social History in the Epic system.    Review of Systems   Gastrointestinal: Positive for abdominal pain, nausea and vomiting.   Genitourinary: Positive for difficulty urinating.   All other systems reviewed and are negative.      Physical Exam   BP: 142/77  Pulse: 77  Heart Rate: 76  Temp: 97.6  F (36.4  C)  Resp: 18  Height: 185.4 cm (6' 1\")  Weight: 117 kg (258 lb)  SpO2: 99 %      Physical Exam   Constitutional: He is oriented to person, place, and " time. He appears well-developed. No distress.   HENT:   Head: Normocephalic and atraumatic.   Mouth/Throat: Oropharynx is clear and moist.   Eyes: Conjunctivae and EOM are normal. Pupils are equal, round, and reactive to light.   Neck: Normal range of motion. Neck supple.   Cardiovascular: Normal rate, regular rhythm and normal heart sounds.   Pulmonary/Chest: Effort normal and breath sounds normal. No respiratory distress.   Abdominal: Soft.   Surgical incision in right mid abdomen healing well with mild TTP, no drainage, no surrounding erythema; no peritoneal signs; bedside bladder scan 200cc urine   Musculoskeletal: Normal range of motion.   Neurological: He is alert and oriented to person, place, and time. No cranial nerve deficit.   Skin: Skin is warm and dry. Capillary refill takes less than 2 seconds. No rash noted.   Psychiatric: He has a normal mood and affect. His behavior is normal.       ED Course        Procedures           .  Results for orders placed or performed during the hospital encounter of 02/07/19   CBC with platelets differential   Result Value Ref Range    WBC 9.5 4.0 - 11.0 10e9/L    RBC Count 4.76 4.4 - 5.9 10e12/L    Hemoglobin 15.2 13.3 - 17.7 g/dL    Hematocrit 44.4 40.0 - 53.0 %    MCV 93 78 - 100 fl    MCH 31.9 26.5 - 33.0 pg    MCHC 34.2 31.5 - 36.5 g/dL    RDW 13.0 10.0 - 15.0 %    Platelet Count 175 150 - 450 10e9/L    Diff Method Automated Method     % Neutrophils 59.9 %    % Lymphocytes 31.2 %    % Monocytes 7.4 %    % Eosinophils 1.1 %    % Basophils 0.2 %    % Immature Granulocytes 0.2 %    Nucleated RBCs 0 0 /100    Absolute Neutrophil 5.7 1.6 - 8.3 10e9/L    Absolute Lymphocytes 3.0 0.8 - 5.3 10e9/L    Absolute Monocytes 0.7 0.0 - 1.3 10e9/L    Absolute Eosinophils 0.1 0.0 - 0.7 10e9/L    Absolute Basophils 0.0 0.0 - 0.2 10e9/L    Abs Immature Granulocytes 0.0 0 - 0.4 10e9/L    Absolute Nucleated RBC 0.0    Basic metabolic panel   Result Value Ref Range    Sodium 139 133 - 144  mmol/L    Potassium 3.9 3.4 - 5.3 mmol/L    Chloride 103 94 - 109 mmol/L    Carbon Dioxide 28 20 - 32 mmol/L    Anion Gap 7 3 - 14 mmol/L    Glucose 193 (H) 70 - 99 mg/dL    Urea Nitrogen 17 7 - 30 mg/dL    Creatinine 0.76 0.66 - 1.25 mg/dL    GFR Estimate >90 >60 mL/min/[1.73_m2]    GFR Estimate If Black >90 >60 mL/min/[1.73_m2]    Calcium 8.5 8.5 - 10.1 mg/dL   UA with Microscopic   Result Value Ref Range    Color Urine Yellow     Appearance Urine Clear     Glucose Urine 300 (A) NEG^Negative mg/dL    Bilirubin Urine Negative NEG^Negative    Ketones Urine 40 (A) NEG^Negative mg/dL    Specific Gravity Urine 1.019 1.003 - 1.035    Blood Urine Negative NEG^Negative    pH Urine 6.5 5.0 - 7.0 pH    Protein Albumin Urine 10 (A) NEG^Negative mg/dL    Urobilinogen mg/dL Normal 0.0 - 2.0 mg/dL    Nitrite Urine Negative NEG^Negative    Leukocyte Esterase Urine Negative NEG^Negative    Source Midstream Urine     WBC Urine 1 0 - 5 /HPF    RBC Urine 1 0 - 2 /HPF    Squamous Epithelial /HPF Urine <1 0 - 1 /HPF    Mucous Urine Present (A) NEG^Negative /LPF             Consults  Neurosurgery: Responded (02/07/19 5050)    Assessments & Plan (with Medical Decision Making)   Jeff Lewis is a 58 year old male who has a past medical history of multiple sclerosis, chronic right-sided neck pain with intrathecal drug delivery system in place 8/14/2015 which was removed yesterday 2/6/2019 by neurosurgery with no complications who presented to the emergency department from home with his wife with a complaint of urinary retention, nausea, vomiting.  Upon arrival patient is well-appearing, afebrile, no distress.  On examination patient with some mild abdominal tenderness around the surgical incision but otherwise surgical incision is healing well, no drainage, no erythema.  Bedside bladder scan demonstrated 200 cc of urine in his bladder.   Differential diagnosis includes but is not limited to urinary retention versus bladder spasms  versus infection versus dehydration versus kidney injury versus metabolic or electrolyte abnormality versus surgical complication versus anesthesia side effect.    At this time will check labs, will give Zofran, hydrated with 1 L normal saline IV fluid bolus as patient has not had much to eat or drink all day and has had multiple episodes of nausea and vomiting.  Will rescan bladder after IV fluids and see if patient can urinate on his own.  If not we will consider straight catheterization versus Nevarez catheterization.  I discussed the case with neurosurgery resident who has concerns for any complications regarding the surgical procedure in relation to the nausea, vomiting, and urinary tension.  It is unclear if the patient had a Nevarez catheter in place for the surgical procedure    I reviewed comprehensive labs which are unremarkable with white blood cell count 9.5, hemoglobin of 15.2, no acute metabolic or electrolyte abnormality, glucose elevated at 193.  Patient feeling better after Zofran and IV hydration.  On repeat bladder scan patient had 650 cc of urine in his bladder and still unable to urinate so discussed placing Nevarez catheter.  At this time patient reattempted and was able to urinate on his own.  I reviewed urinalysis which was remarkable for glucosuria, ketonuria with no evidence of acute infection.     At this time patient would like to be discharged home.  I recommend continue supportive care, oral hydration Zofran as needed for nausea and vomiting, recommend close follow-up with his primary care physician, surgeon, and return precautions discussed if high fever, persistent vomiting, severe pain, inability urinate, or any worsening of his symptoms. Patient understands and agrees with the plan.      I have reviewed the nursing notes.    I have reviewed the findings, diagnosis, plan and need for follow up with the patient.       Medication List      Started    ondansetron 4 MG tablet  Commonly known  as:  ZOFRAN  4 mg, Oral, EVERY 8 HOURS PRN            Final diagnoses:   Urinary retention   Nausea and vomiting in adult       2/7/2019   [facility], [place], EMERGENCY DEPARTMENT     Yamilet Avendaño MD  02/07/19 0711

## 2019-02-07 NOTE — ED TRIAGE NOTES
58 year old male presents to the Ed with his wife with chief complain of urinary rentention. Pt states he hs not been able to urinate since around 1200 noon yesterday prior to surgery.  Pt was discharged from the hospital yesterday after undergoing a surgical procedure. Reports he has the urge to urinate but he is unable to. He is also complaining for nausea and vomiting which started after he was discharged.

## 2019-02-07 NOTE — ED AVS SNAPSHOT
[facility], [facility], Emergency Department  [address] [place] [address]  Phone:  [phone]                                    Jeff Lewis   MRN: [id number]    Department:  [facility], Emergency Department   Date of Visit:  2/7/2019           After Visit Summary Signature Page    I have received my discharge instructions, and my questions have been answered. I have discussed any challenges I see with this plan with the nurse or doctor.    ..........................................................................................................................................  Patient/Patient Representative Signature      ..........................................................................................................................................  Patient Representative Print Name and Relationship to Patient    ..................................................               ................................................  Date                                   Time    ..........................................................................................................................................  Reviewed by Signature/Title    ...................................................              ..............................................  Date                                               Time          22EPIC Rev 08/18

## 2019-02-07 NOTE — ED NOTES
Pt expressed that he did not want a sarabia catheter. He mentioned that he would like to talk to the urologist on call that he had spoken to earlier in the night. He states he is having increasing discomfort and pain from not being able to urinate.

## 2019-02-07 NOTE — OP NOTE
DATE OF PROCEDURE:  02/06/2019     PREOPERATIVE DIAGNOSIS:    1.  Chronic right sided neck pain.  2.  Status post intrathecal drug delivery system implantation.  3.  Pain at the pump implant site - right rib pain.  4.  Inactive intrathecal drug delivery system.     POSTOPERATIVE DIAGNOSIS:    1.  Chronic right sided neck pain.  2.  Status post intrathecal drug delivery system implantation.  3.  Pain at the pump implant site - right rib pain.  4.  Inactive intrathecal drug delivery system.     PROCEDURES PERFORMED:   1.  Removal of intrathecal drug delivery system pump from the right abdomen.  2.  Removal of recently placed intrathecal catheter.   3.  Removal of old retained intrathecal catheter.   4.  Use of intraoperative fluoroscopy for localization of catheters.    ATTENDING SURGEON:  Jeff Rees MD, PhD    RESIDENT SURGEONS:    1.  Karthik Blake MD  2.  Ashvin Tillman MD     ANESTHESIA:  General.     ESTIMATED BLOOD LOSS:  5 mL.    COMPLICATIONS:  None.    FINDINGS:  1.  In the back, two catheters and two anchors were found.  One catheter is the old type catheter and another is the new Ascenda catheter.  Both removed without complications.  Both tips were visualized.  No cerebrospinal fluid is seen.  2.  One SynchroMed II pump in the pocket connected to the Ascenda catheter with sutureless connector system.  3.  The old retained catheter was tied off and in the pump pocket.    EXPLANT  1.  SynchroMed II 20 mL pump  2.  One sutureless connector system  3.  One Ascenda catheter with associated anchor.  4.  One old silastic catheter with associated anchor.    INDICATIONS FOR PROCEDURE:  Mr. Jeff Lewis is a 58-year-old male with a 10+ year history of chronic right-sided neck pain located around the lower part of his skull to the base of his neck.  The region is hypersensitive to touch.  The pain is constant and is described as sharp and burning, though he is managing the pain and does not desire further  medication or treatment.  He has an intrathecal drug delivery system that was last replaced on 8/14/2015 by [facility].  The surgery was done at [facility] and he was hospitalized for a week due to problems related to the surgery.  Currently, he is unable to afford ziconotide and it has not been infusing medication since 09/2017.  The alarm has been ringing for 2.5 months.  The alarm is a low reservoir alarm.  Even though the pump has normal saline in it, he has not been able to find a provider that was willing to fill it with normal saline.  Since losing weight, the pump has been irritating his right lower rib which makes him unable to lie on his right side. Thus, he is looking to have the pain pump explanted.  Of note, he had a cervical spinal cord stimulator done at [facility] which did not help his pain.  He is also being followed by [facility] and has reinitiated daily glatiramer acetate therapy as of November 2018.  Upon questioning, patient states that there was no Dacron pouch placed during his pump replacement.  He also notes the old catheter from the first pump placement was left in place during the replacement in 08/2015.  He was told that it could not be removed.  He also states that he does not wish to return to [facility] to have anything done, including removal of his intrathecal drug delivery system because he was not satisfied with the care he received.  He takes ibuprofen as needed, with the latest use being yesterday.  He is not on anticoagulation/antiplatelet therapy.  He states that he is searching for alternative therapies but no one is willing to see him due to his intrathecal drug delivery system.  Patient presents for consideration of removing his current intrathecal drug delivery system.  There are several reasons for this.  First, he is unable to afford the ziconotide medication.  Second, he is no longer using the intrathecal drug delivery system for any  therapy, even for other medications.  Third, he is having pain at his ribs from the pump itself.  And forth, he states that he is unable to pursue other therapy options as no provider would see him given that he has an intrathecal drug delivery system.  These are reasonable causes for the removal of the system.  He does not appear to be having any issues related to medications or cerebrospinal fluid related issues.  The pump only has normal saline in it, although it is now empty, based on the pump alarm going off.  It is difficult to understand but he has not been able to find any provider who would refill his pump with normal saline.  It is even more difficult to understand that no providers would consider him for other therapies and would not even see him due to the presence of the system.  One concern is the fact that he has a retained catheter in his back from his last revision surgery.  It appears to be the older catheter type.  It was reportedly just tied off.  There are no new images or recent images to see what is implanted in his body currently.  We did discuss the surgical procedure for the removal of the system.  I told him that I would try to remove all the components if possible, including the catheter that was left behind at the last surgery.  I'm not exactly sure why the previous surgeon left it behind.  He was told that it was stuck but based on the images, the anchor still remains so I'm not sure if that was what happened.  Risks of intrathecal pain pump explant are primarily bleeding and infection.  Ongoing cerebrospinal fluid leak and spinal headaches were also discussed.  Possibility of not being able to remove all the components, especially the catheter, was also discussed.  Given that he has only normal saline, there are no concerns for drug withdrawal.  Recovery time was discussed to range from a few days up to a week at most.  Surgery will be performed under general anesthesia, per patient's  request, and it would be a same day surgery.    DESCRIPTION OF PROCEDURE:  After informed consent was verified, Jeff was brought to the operating room and was laid supine on the flat Fausto table.  With the placement of endotracheal tube, general anesthesia was obtained.  He was then turned left lateral decubitus position with the right side up.  All pressure points were well padded.  Axillary role was placed and bean bag was activated for stabilizing the patient.  His hips and shoulders were taped for securing his position.  At this time, fluoroscopy was brought in to localize the catheters and pump.  His thoracolumbar area and right abdomen area were cleaned, prepped and draped in the usual sterile fashion.  His right abdominal incision and his lumbar incision was marked.  Time out was performed confirming the patient's identity, procedure to be performed, site and side of the surgery, imaging corresponding to the patient and the administration of preoperative prophylactic antibiotic.  10 mL of with 0.5% Marcaine with 1:200,000 epinephrine was injected in the subcutaneous tissue surrounding the previous abdominal incision and 10 mL was also injected around the midline vertical incision on his lumbar spine.       Attention was first turned to the right abdomen.  After waiting a sufficient time for maximal effect of local anesthetic and epinephrine, a #10 scalpel was used to sharply incise the previous horizontal incision in the right abdomen.  Hemostasis and further dissection was performed with monopolar cautery and a #10 scalpel down through the scar tissue to the capsule which was sharply opened and expanded.  The previous pump was mobilized and pulled out of the abdominal pump pocket.  There were anchoring sutures and these were released and removed in the process.      The intrathecal catheter was mobilized.  The currently connected catheter was the Ascenda catheter connected using a sutureless connector  system, connected to the intrathecal portion of the catheter using a sutureless collet connector.  This was easily identified.  There was a second older type, silastic catheter which was buried within the scar tissue of the abdominal pump pocket.  This was found and it appeared to be tied in a knot at the trimmed end.  Therefore, we now had the pump out of the pocket and two catheters identified.  We were able to gently pull on each of the catheters and palpate the locations of the anchors in the lumbar area.      We brought in the intraoperative fluoroscopy to further identify and localize the catheter.  The lumbar incision was opened about 4 cm, not using the entire length of the scar.  The incision was deepened until we were in the subcutaneous fat layer which has now become scar layer.  It was definitely above the lumbar fascial layer.  Using a combination of blunt and sharp dissection technique, both the catheters and its anchors were identified and dissected.      First, each of the catheters were cut distal to the anchors, as they were going towards the pump.  Hemostats were used to keep the catheter clamped and marked.  Then, both catheters were pulled out easily from the abdominal pocket.  The cut ends of the catheters were visualized, indicating that there were no remaining catheters along the track and that the catheter did not break.  The catheters came out easily.     Then, the anchors were cut out, thus leaving the two catheters as they were headed into the spine.  These catheters were clamped with hemostats.  Cerebrospinal fluid was seen coming from within the catheters prior to being clamped.  Then, 0 Vicryl suture was placed around the old silastic catheter as it was headed into the spine.  Then, the silastic catheter was pulled out easily from the intrathecal space.  The tip was visualized, confirming that the entire catheter was removed.  The 0 Vicryl suture was tied, thus closing the track.  No  cerebrospinal fluid leak was noted.  Then, 0 Vicryl suture was placed around the Ascenda catheter as it was headed into the spine.  Then, the Ascenda catheter was pulled out easily from the intrathecal space.  The tip was visualized, confirming that the entire catheter was removed.  The 0 Vicryl suture was tied, thus closing the track.  No cerebrospinal fluid leak was noted.  Therefore, the entire system was removed, including the previously retained catheter.    We took fluoroscopy images to confirm that there were no remaining intrathecal catheter or pump.  With complete removal of the system and confirmation of no cerebrospinal fluid leak, we generously irrigated the wounds with antibiotic impregnated solution and meticulous hemostasis was obtained.  Then, we began closing the wound.    The lumbar incision was closed in the following manner.  The deep scar layer was reapproximated using 2-0 Vicryl sutures to minimize the dead space.  Then, the dermal layer was reapproximated using 2-0 Vicryl sutures in an inverted interrupted fashion.  The skin was reapproximated using 4-0 Monocryl suture in a subcuticular fashion.    The right abdominal incision was closed in the following manner.  Few 2-0 Vicryl sutures were placed within the pocket to collapse the walls of the pocket together to minimize the dead space.  Then, the pump pocket capsule was reapproximated using 2-0 Vicryl sutures in an inverted interrupted fashion.  The deep subcutaneous fat layer was reapproximated using 2-0 Vicryl sutures in an inverted interrupted fashion.  The dermal layer was reapproximated using 2-0 Vicryl sutures in an inverted interrupted fashion.  The skin was reapproximated using a 4-0 Monocryl suture in a subcuticular fashion.    All the wounds were cleaned and dried and re-prepped with ChoraPrep.  Dermabond was applied to all the incisions.    The patient was then positioned supine onto the bed.  The patient was extubated and taken to  the recovery room in a stable condition.  At the end of the case, all counts including needle, sponge and instrument counts were correct x 2.  There were no complications.     Dr. Nolen was present for entire procedure.       KEYONNA NOLEN MD, PHD    As dictated by MATTHEW CHRISTENSEN MD       NEUROSURGERY ATTENDING ATTESTATION: Keyonna QIU M.D., Ph.D., Neurosurgery Attending, was present and scrubbed for the entire case and performed the key and critical portions of the case.      D: 2019   T: 2019   MT: MARCUS     Name:     KEYONNA JENKINS   MRN:      [id number]        Account:        [id number]   :      1960           Procedure Date: 2019     Document: [id number]

## 2019-02-08 ENCOUNTER — PATIENT OUTREACH (OUTPATIENT)
Dept: NEUROSURGERY | Facility: CLINIC | Age: 59
End: 2019-02-08

## 2019-02-08 LAB
BACTERIA SPEC CULT: NO GROWTH
Lab: NORMAL
SPECIMEN SOURCE: NORMAL

## 2019-02-08 NOTE — PROGRESS NOTES
[facility]: Post-Discharge Note  SITUATION                                                      Admission:    Admission Date: 02/06/19   Reason for Admission: Explant of intrathecal pain pump, catheter and retained catheter  Discharge:   Discharge Date: 02/06/19  Discharge Diagnosis: Status post pump pain, rib pain  Discharge Service: Neurosurgery  Discharge Plan: Routine post op follow up    BACKGROUND                                                      Neurosurgery Discharge Coordination Note     Attending physician: Dr. Rees  Discharge to: Home     Current status: Patient states he is able to urinate without difficulty now (see post operative ED notes). His incision sites are tender and he is sore and uncomfortable, but understands this is expected after surgery. Taking oxycodone as needed with some relief. We discussed that pain medication will not reduce pain completely; rather it can help take the edge off the pain. Pt expressed understanding. Denies redness, swelling, increased tenderness, drainage, incision opening or elevated temp. Reports Incision CDI without signs of infection.  He is constipated but is taking an OTC fiber gummy. We discussed increasing fluid intake, dietary fiber, short frequent walks to help with constipation.    Discharge instructions and medications reviewed with patient.  Follow up appointments/imaging/tests needed: 2 week post op with URI Retana. See below  RN triage/on call number given: [phone]/ [phone]        ASSESSMENT      Discharge Assessment  Patient reports symptoms are: Unchanged  Does the patient have all of their medications?: Yes  Does patient know what their new medications are for?: Yes  Does patient have a follow-up appointment scheduled?: Yes  Does patient have any other questions or concerns?: No    Post-op  Did the patient have surgery or a procedure: Yes  Incision: closed;healing  Drainage: No  Bleeding: none  Fever: No  Chills:  No  Redness: No  Warmth: No  Swelling: No  Incision site pain: Yes  Closure: suture  Eating & Drinking: eating and drinking without complaints/concerns  PO Intake: regular diet  Bowel Function: constipation  Urinary Status: voiding without complaint/concerns        PLAN                                                      Outpatient Plan:  Routine post op follow up    Future Appointments   Date Time Provider Department Center   2/19/2019  1:30 PM Amarilis Retana PA-C [facility]   5/16/2019 11:30 AM Federico Rodríguez MD [facility]           TOM MORALES RN

## 2019-02-08 NOTE — RESULT ENCOUNTER NOTE
Final urine culture report is NEGATIVE per [facility] Lab Result protocol.    If NEGATIVE result, no change in treatment, per [facility] ED Lab Result protocol.

## 2019-02-19 ENCOUNTER — OFFICE VISIT (OUTPATIENT)
Dept: NEUROSURGERY | Facility: CLINIC | Age: 59
End: 2019-02-19
Payer: COMMERCIAL

## 2019-02-19 VITALS
OXYGEN SATURATION: 97 % | HEART RATE: 96 BPM | BODY MASS INDEX: 35.19 KG/M2 | WEIGHT: 259.8 LBS | SYSTOLIC BLOOD PRESSURE: 143 MMHG | HEIGHT: 72 IN | DIASTOLIC BLOOD PRESSURE: 84 MMHG

## 2019-02-19 DIAGNOSIS — Z97.8 PRESENCE OF INTRATHECAL BACLOFEN PUMP: Primary | ICD-10-CM

## 2019-02-19 DIAGNOSIS — Z98.890 POST-OPERATIVE STATE: ICD-10-CM

## 2019-02-19 ASSESSMENT — PAIN SCALES - GENERAL: PAINLEVEL: EXTREME PAIN (8)

## 2019-02-19 ASSESSMENT — MIFFLIN-ST. JEOR: SCORE: 2018.51

## 2019-02-19 NOTE — LETTER
"2/19/2019       RE: Jeff Lewis  [facility] [place] [address]     Dear Colleague,    Thank you for referring your patient, Jeff Lewis, to the [facility] NEUROSURGERY at [facility]. Please see a copy of my visit note below.    NEUROSURGERY WOUND CHECK  February 19, 2019    ATTENDING:  Negrita  PROCEDURE:  2/6/19  DIAGNOSIS:  Status post pump pain, rib pain.       PROCEDURES PERFORMED:   1.  Removal of intrathecal pump.   2.  Removal of intrathecal catheter.   3.  Removal of old intrathecal retained catheter.     INDICATIONS:  Jeff Lewis is a 58-year-old gentleman with a history of chronic pain treated with intrathecal pain medication.  He underwent a period where he did not use his pump due to the expense of the drug and other reasons and was unable to find a physician to refill his pump with normal saline.  He also developed right-sided rib pain and because of this, he asked for the pump to be removed.  He underwent informed consent process after discussion of the potential benefits, potential risks and alternatives.  All questions were answered in full.   HPI:  Jeff Lewis is a pleasant 58 year old male now 2 weeks status post the above procedure.  The procedure itself was without incident.  He recovered well and was discharged home in good condition.  Since surgery he still has some discomfort in the abdominal site, where the pump was removed.  Otherwise he is doing quite well and has no complaints.  In fact he was very pleased with his experience from start to finish.  He presents for routine post op visit    EXAM:  Ht 1.816 m (5' 11.5\")   Wt 117.8 kg (259 lb 12.8 oz)   BMI 35.73 kg/m     Well developed well nourished male found seated comfortably in exam chair.  No apparent distress.   A&O X3.  Mood and affect WNL. Language and fund of knowledge intact.  Is able to sit and rise independently.   He has nicely healing incisions.  I prepped " the wound with chloroprep and cleanly removed tissue glue without difficulty.    ASSESSMENT/PLAN  1. Presence of intrathecal baclofen pump    2. Post-operative state      Doing well now 2 weeks sp removal of intrathecal pump and catheters.  Wounds look great.  May swim or immerse wound when all scabbing has disappeared.  May use ice as needed for occasional discomfort.  Continue activity restrictions for another week, then return slowly to normal activities.  Follow up NS PRN.     It has been a pleasure looking after this very nice patient. We appreciate your confidence in the [facility], Department of Neurosurgery.    Amarilis Retana PA-C  [facility]  Department of Neurosurgery  Phone: [phone]  Fax: [phone]    This note was generated using voice recognition software. While edited for content some inaccurate phrasing may be found.

## 2019-02-19 NOTE — PROGRESS NOTES
"  NEUROSURGERY WOUND CHECK  February 19, 2019    ATTENDING:  Negrita  PROCEDURE:  2/6/19  DIAGNOSIS:  Status post pump pain, rib pain.       PROCEDURES PERFORMED:   1.  Removal of intrathecal pump.   2.  Removal of intrathecal catheter.   3.  Removal of old intrathecal retained catheter.     INDICATIONS:  Jeff Lewis is a 58-year-old gentleman with a history of chronic pain treated with intrathecal pain medication.  He underwent a period where he did not use his pump due to the expense of the drug and other reasons and was unable to find a physician to refill his pump with normal saline.  He also developed right-sided rib pain and because of this, he asked for the pump to be removed.  He underwent informed consent process after discussion of the potential benefits, potential risks and alternatives.  All questions were answered in full.   HPI:  Jeff Lewis is a pleasant 58 year old male now 2 weeks status post the above procedure.  The procedure itself was without incident.  He recovered well and was discharged home in good condition.  Since surgery he still has some discomfort in the abdominal site, where the pump was removed.  Otherwise he is doing quite well and has no complaints.  In fact he was very pleased with his experience from start to finish.  He presents for routine post op visit    EXAM:  Ht 1.816 m (5' 11.5\")   Wt 117.8 kg (259 lb 12.8 oz)   BMI 35.73 kg/m    Well developed well nourished male found seated comfortably in exam chair.  No apparent distress.   A&O X3.  Mood and affect WNL. Language and fund of knowledge intact.  Is able to sit and rise independently.   He has nicely healing incisions.  I prepped the wound with chloroprep and cleanly removed tissue glue without difficulty.    ASSESSMENT/PLAN  1. Presence of intrathecal baclofen pump    2. Post-operative state      Doing well now 2 weeks sp removal of intrathecal pump and catheters.  Wounds look great.  May swim or immerse wound " when all scabbing has disappeared.  May use ice as needed for occasional discomfort.  Continue activity restrictions for another week, then return slowly to normal activities.  Follow up NS PRN.     It has been a pleasure looking after this very nice patient. We appreciate your confidence in the [facility], Department of Neurosurgery.    Amarilis Retana PA-C  [facility]  Department of Neurosurgery  Phone: [phone]  Fax: [phone]      This note was generated using voice recognition software. While edited for content some inaccurate phrasing may be found.

## 2019-02-19 NOTE — NURSING NOTE
Chief Complaint   Patient presents with     RECHECK     [facility] RETURN 2 WK POST OP       Jasmyn Montague, EMT

## 2019-09-30 ENCOUNTER — HEALTH MAINTENANCE LETTER (OUTPATIENT)
Age: 59
End: 2019-09-30

## 2020-03-15 ENCOUNTER — HEALTH MAINTENANCE LETTER (OUTPATIENT)
Age: 60
End: 2020-03-15

## 2021-01-15 ENCOUNTER — HEALTH MAINTENANCE LETTER (OUTPATIENT)
Age: 61
End: 2021-01-15

## 2021-05-09 ENCOUNTER — HEALTH MAINTENANCE LETTER (OUTPATIENT)
Age: 61
End: 2021-05-09

## 2021-05-24 ENCOUNTER — RECORDS - HEALTHEAST (OUTPATIENT)
Dept: ADMINISTRATIVE | Facility: CLINIC | Age: 61
End: 2021-05-24

## 2021-05-29 ENCOUNTER — RECORDS - HEALTHEAST (OUTPATIENT)
Dept: ADMINISTRATIVE | Facility: CLINIC | Age: 61
End: 2021-05-29

## 2021-05-31 ENCOUNTER — RECORDS - HEALTHEAST (OUTPATIENT)
Dept: ADMINISTRATIVE | Facility: CLINIC | Age: 61
End: 2021-05-31

## 2021-06-01 ENCOUNTER — RECORDS - HEALTHEAST (OUTPATIENT)
Dept: ADMINISTRATIVE | Facility: CLINIC | Age: 61
End: 2021-06-01

## 2021-08-29 ENCOUNTER — HEALTH MAINTENANCE LETTER (OUTPATIENT)
Age: 61
End: 2021-08-29

## 2021-10-24 ENCOUNTER — HEALTH MAINTENANCE LETTER (OUTPATIENT)
Age: 61
End: 2021-10-24

## 2022-04-10 ENCOUNTER — HEALTH MAINTENANCE LETTER (OUTPATIENT)
Age: 62
End: 2022-04-10

## 2022-06-05 ENCOUNTER — HEALTH MAINTENANCE LETTER (OUTPATIENT)
Age: 62
End: 2022-06-05

## 2022-10-15 ENCOUNTER — HEALTH MAINTENANCE LETTER (OUTPATIENT)
Age: 62
End: 2022-10-15

## 2023-02-01 NOTE — PHARMACY-ADMISSION MEDICATION HISTORY
Admission medication history interview status for the 3/25/2017 admission is complete. See Epic admission navigator for allergy information, pharmacy, prior to admission medications and immunization status.     Medication history interview sources:  Patient, patient's spouse, [facility]'s pharmacy ([facility])    Changes made to PTA medication list (reason)  Added: None  Deleted: None   Changed: Pain pump contains ziconotide and hydromorphone (unknown dose - pt sees Sidra Lopez at the [facility] if confirmation needed), metformin 500mg ER daily with titration instructions.    Additional medication history information (including reliability of information, actions taken by pharmacist):None      Prior to Admission medications    Medication Sig Last Dose Taking? Auth Provider   METFORMIN HCL PO Take by mouth daily (with breakfast)  Yes Reported, Patient   IBUPROFEN PO Take 600 mg by mouth every 6 hours as needed for moderate pain 3/26/2017 at Unknown time Yes Reported, Patient   Medication given by intrathecal pump NO DRUG NEEDED.  Patient taking differently: NO DRUG NEEDED. Unknown at Unknown time  Sukumar Salinas MD         Medication history completed by: Sofia Machuca   Detail Level: Simple Additional Notes: Pt will treat otc. Render Risk Assessment In Note?: no

## 2023-06-01 ENCOUNTER — HEALTH MAINTENANCE LETTER (OUTPATIENT)
Age: 63
End: 2023-06-01

## 2023-08-20 ENCOUNTER — HEALTH MAINTENANCE LETTER (OUTPATIENT)
Age: 63
End: 2023-08-20

## 2024-01-07 ENCOUNTER — HEALTH MAINTENANCE LETTER (OUTPATIENT)
Age: 64
End: 2024-01-07

## (undated) DEVICE — PREP SKIN SCRUB TRAY 4461A

## (undated) DEVICE — SOL WATER IRRIG 1000ML BOTTLE 2F7114

## (undated) DEVICE — STPL SKIN 35W ROTATING HEAD PRW35

## (undated) DEVICE — SU VICRYL 3-0 SH 8X18" UND J864D

## (undated) DEVICE — SU DERMABOND ADVANCED .7ML DNX12

## (undated) DEVICE — GLOVE PROTEXIS MICRO 7.5  2D73PM75

## (undated) DEVICE — PACK NEURO MINOR UMMC SNE32MNMU4

## (undated) DEVICE — DRAPE LAP TRANSVERSE 29421

## (undated) DEVICE — DRAPE SHEET MED 44X70" 9355

## (undated) DEVICE — SPONGE RAY-TEC 4X8" 7318

## (undated) DEVICE — SYR 10ML LL W/O NDL 302995

## (undated) DEVICE — PREP CHLORAPREP CLEAR 3ML 260400

## (undated) DEVICE — SYR 30ML LL W/O NDL 302832

## (undated) DEVICE — SYR EAR BULB 3OZ 0035830

## (undated) DEVICE — DECANTER VIAL 2006S

## (undated) DEVICE — TAPE CLOTH 3" CARDINAL 3TRCL03

## (undated) DEVICE — ESU GROUND PAD ADULT W/CORD E7507

## (undated) DEVICE — GLOVE PROTEXIS BLUE W/NEU-THERA 8.0  2D73EB80

## (undated) DEVICE — ESU PENCIL W/COATED BLADE E2450H

## (undated) DEVICE — BLADE CLIPPER SGL USE 9680

## (undated) DEVICE — SU MONOCRYL 4-0 PS-2 27" UND Y426H

## (undated) DEVICE — PREP POVIDONE IODINE SOLUTION 10% 4OZ

## (undated) DEVICE — SOL NACL 0.9% IRRIG 1000ML BOTTLE 2F7124

## (undated) DEVICE — SUCTION MANIFOLD DORNOCH ULTRA CART UL-CL500

## (undated) DEVICE — ESU ELEC BLADE 2.75" COATED/INSULATED E1455

## (undated) DEVICE — PREP POVIDONE IODINE SCRUB 7.5% 4OZ APL82212

## (undated) DEVICE — NDL BLUNT 18GA 1" W/O FILTER 305181

## (undated) DEVICE — PAD CHUX UNDERPAD 23X24" 7136

## (undated) DEVICE — SU VICRYL 2-0 CT-2 CR 8X18" J726D

## (undated) DEVICE — LINEN TOWEL PACK X5 5464

## (undated) DEVICE — SU SILK 2-0 TIE 12X30" A305H

## (undated) DEVICE — DRAPE IOBAN INCISE 13X13" 6640EZ

## (undated) RX ORDER — PROPOFOL 10 MG/ML
INJECTION, EMULSION INTRAVENOUS
Status: DISPENSED
Start: 2019-02-06

## (undated) RX ORDER — ACETAMINOPHEN 325 MG/1
TABLET ORAL
Status: DISPENSED
Start: 2019-02-06

## (undated) RX ORDER — BACITRACIN 50000 [IU]/1
INJECTION, POWDER, FOR SOLUTION INTRAMUSCULAR
Status: DISPENSED
Start: 2019-02-06

## (undated) RX ORDER — GABAPENTIN 300 MG/1
CAPSULE ORAL
Status: DISPENSED
Start: 2019-02-06

## (undated) RX ORDER — FENTANYL CITRATE 50 UG/ML
INJECTION, SOLUTION INTRAMUSCULAR; INTRAVENOUS
Status: DISPENSED
Start: 2017-10-05

## (undated) RX ORDER — FENTANYL CITRATE 50 UG/ML
INJECTION, SOLUTION INTRAMUSCULAR; INTRAVENOUS
Status: DISPENSED
Start: 2019-02-06

## (undated) RX ORDER — SODIUM CHLORIDE 9 MG/ML
INJECTION, SOLUTION INTRAVENOUS
Status: DISPENSED
Start: 2019-02-06

## (undated) RX ORDER — SODIUM CHLORIDE 9 MG/ML
INJECTION, SOLUTION INTRAVENOUS
Status: DISPENSED
Start: 2017-10-05

## (undated) RX ORDER — PHENYLEPHRINE HCL IN 0.9% NACL 1 MG/10 ML
SYRINGE (ML) INTRAVENOUS
Status: DISPENSED
Start: 2019-02-06

## (undated) RX ORDER — FENTANYL CITRATE 50 UG/ML
INJECTION, SOLUTION INTRAMUSCULAR; INTRAVENOUS
Status: DISPENSED
Start: 2017-03-23

## (undated) RX ORDER — SODIUM CHLORIDE 9 MG/ML
INJECTION, SOLUTION INTRAVENOUS
Status: DISPENSED
Start: 2017-03-23

## (undated) RX ORDER — BUPIVACAINE HYDROCHLORIDE AND EPINEPHRINE 5; 5 MG/ML; UG/ML
INJECTION, SOLUTION EPIDURAL; INTRACAUDAL; PERINEURAL
Status: DISPENSED
Start: 2019-02-06